# Patient Record
Sex: FEMALE | Race: BLACK OR AFRICAN AMERICAN | NOT HISPANIC OR LATINO | URBAN - METROPOLITAN AREA
[De-identification: names, ages, dates, MRNs, and addresses within clinical notes are randomized per-mention and may not be internally consistent; named-entity substitution may affect disease eponyms.]

---

## 2023-03-31 DIAGNOSIS — I10 BENIGN ESSENTIAL HYPERTENSION: ICD-10-CM

## 2023-04-06 PROBLEM — H10.9 BACTERIAL CONJUNCTIVITIS: Status: ACTIVE | Noted: 2023-04-06

## 2023-04-06 PROBLEM — E55.9 VITAMIN D DEFICIENCY: Status: ACTIVE | Noted: 2023-04-06

## 2023-04-06 PROBLEM — R05.9 COUGH: Status: ACTIVE | Noted: 2023-04-06

## 2023-04-06 PROBLEM — I10 WHITE COAT SYNDROME WITH DIAGNOSIS OF HYPERTENSION: Status: ACTIVE | Noted: 2023-04-06

## 2023-04-06 PROBLEM — N84.1 CERVICAL POLYP: Status: ACTIVE | Noted: 2023-04-06

## 2023-04-06 PROBLEM — I10 BENIGN ESSENTIAL HYPERTENSION: Status: ACTIVE | Noted: 2023-04-06

## 2023-04-06 PROBLEM — R00.0 RACING HEART BEAT: Status: ACTIVE | Noted: 2023-04-06

## 2023-04-06 PROBLEM — N93.9 ABNORMAL UTERINE BLEEDING (AUB): Status: ACTIVE | Noted: 2023-04-06

## 2023-04-06 PROBLEM — E87.6 HYPOKALEMIA: Status: ACTIVE | Noted: 2023-04-06

## 2023-04-06 PROBLEM — E78.2 HYPERLIPEMIA, MIXED: Status: ACTIVE | Noted: 2023-04-06

## 2023-04-06 PROBLEM — D21.9 FIBROIDS: Status: ACTIVE | Noted: 2023-04-06

## 2023-04-06 PROBLEM — J20.9 ACUTE BRONCHITIS: Status: ACTIVE | Noted: 2023-04-06

## 2023-04-06 PROBLEM — N28.9 RENAL INSUFFICIENCY: Status: ACTIVE | Noted: 2023-04-06

## 2023-04-06 PROBLEM — R87.613 HGSIL (HIGH GRADE SQUAMOUS INTRAEPITHELIAL LESION) ON PAP SMEAR OF CERVIX: Status: ACTIVE | Noted: 2023-04-06

## 2023-04-06 PROBLEM — H61.20 CERUMINOSIS: Status: ACTIVE | Noted: 2023-04-06

## 2023-04-06 PROBLEM — J01.90 ACUTE SINUSITIS: Status: ACTIVE | Noted: 2023-04-06

## 2023-04-06 PROBLEM — G47.30 APNEA, SLEEP: Status: ACTIVE | Noted: 2023-04-06

## 2023-04-06 PROBLEM — L91.8 SKIN TAG: Status: ACTIVE | Noted: 2023-04-06

## 2023-04-06 PROBLEM — R00.2 PALPITATIONS: Status: ACTIVE | Noted: 2023-04-06

## 2023-04-06 RX ORDER — TRIAMTERENE AND HYDROCHLOROTHIAZIDE 75; 50 MG/1; MG/1
TABLET ORAL
Qty: 30 TABLET | Refills: 0 | Status: SHIPPED | OUTPATIENT
Start: 2023-04-06 | End: 2023-05-06

## 2023-05-06 DIAGNOSIS — I10 BENIGN ESSENTIAL HYPERTENSION: ICD-10-CM

## 2023-05-06 RX ORDER — TRIAMTERENE AND HYDROCHLOROTHIAZIDE 75; 50 MG/1; MG/1
TABLET ORAL
Qty: 30 TABLET | Refills: 0 | Status: SHIPPED | OUTPATIENT
Start: 2023-05-06 | End: 2023-06-05

## 2023-06-02 ENCOUNTER — APPOINTMENT (OUTPATIENT)
Dept: PRIMARY CARE | Facility: CLINIC | Age: 53
End: 2023-06-02
Payer: COMMERCIAL

## 2023-06-03 DIAGNOSIS — I10 BENIGN ESSENTIAL HYPERTENSION: ICD-10-CM

## 2023-06-05 RX ORDER — TRIAMTERENE AND HYDROCHLOROTHIAZIDE 75; 50 MG/1; MG/1
TABLET ORAL
Qty: 30 TABLET | Refills: 3 | Status: SHIPPED | OUTPATIENT
Start: 2023-06-05 | End: 2023-08-28

## 2023-06-09 DIAGNOSIS — I10 BENIGN ESSENTIAL HYPERTENSION: ICD-10-CM

## 2023-06-09 RX ORDER — AMLODIPINE BESYLATE 10 MG/1
TABLET ORAL
Qty: 90 TABLET | Refills: 2 | Status: SHIPPED | OUTPATIENT
Start: 2023-06-09 | End: 2023-12-15

## 2023-06-30 ENCOUNTER — OFFICE VISIT (OUTPATIENT)
Dept: PRIMARY CARE | Facility: CLINIC | Age: 53
End: 2023-06-30
Payer: COMMERCIAL

## 2023-06-30 ENCOUNTER — LAB (OUTPATIENT)
Dept: LAB | Facility: LAB | Age: 53
End: 2023-06-30
Payer: COMMERCIAL

## 2023-06-30 VITALS
HEIGHT: 66 IN | SYSTOLIC BLOOD PRESSURE: 124 MMHG | HEART RATE: 84 BPM | WEIGHT: 188.6 LBS | BODY MASS INDEX: 30.31 KG/M2 | DIASTOLIC BLOOD PRESSURE: 84 MMHG

## 2023-06-30 DIAGNOSIS — Z12.31 SCREENING MAMMOGRAM, ENCOUNTER FOR: ICD-10-CM

## 2023-06-30 DIAGNOSIS — I10 BENIGN ESSENTIAL HYPERTENSION: ICD-10-CM

## 2023-06-30 DIAGNOSIS — Z00.00 HEALTH CARE MAINTENANCE: Primary | ICD-10-CM

## 2023-06-30 DIAGNOSIS — N28.9 RENAL INSUFFICIENCY: ICD-10-CM

## 2023-06-30 DIAGNOSIS — Z00.00 HEALTH CARE MAINTENANCE: ICD-10-CM

## 2023-06-30 DIAGNOSIS — E78.2 HYPERLIPEMIA, MIXED: ICD-10-CM

## 2023-06-30 DIAGNOSIS — G47.33 OBSTRUCTIVE SLEEP APNEA SYNDROME: ICD-10-CM

## 2023-06-30 PROBLEM — J01.90 ACUTE SINUSITIS: Status: RESOLVED | Noted: 2023-04-06 | Resolved: 2023-06-30

## 2023-06-30 PROBLEM — J20.9 ACUTE BRONCHITIS: Status: RESOLVED | Noted: 2023-04-06 | Resolved: 2023-06-30

## 2023-06-30 PROBLEM — N84.1 CERVICAL POLYP: Status: RESOLVED | Noted: 2023-04-06 | Resolved: 2023-06-30

## 2023-06-30 PROBLEM — H61.20 CERUMINOSIS: Status: RESOLVED | Noted: 2023-04-06 | Resolved: 2023-06-30

## 2023-06-30 PROBLEM — R05.9 COUGH: Status: RESOLVED | Noted: 2023-04-06 | Resolved: 2023-06-30

## 2023-06-30 PROBLEM — N93.9 ABNORMAL UTERINE BLEEDING (AUB): Status: RESOLVED | Noted: 2023-04-06 | Resolved: 2023-06-30

## 2023-06-30 PROBLEM — H10.9 BACTERIAL CONJUNCTIVITIS: Status: RESOLVED | Noted: 2023-04-06 | Resolved: 2023-06-30

## 2023-06-30 LAB
ALANINE AMINOTRANSFERASE (SGPT) (U/L) IN SER/PLAS: 39 U/L (ref 7–45)
ALBUMIN (G/DL) IN SER/PLAS: 4.3 G/DL (ref 3.4–5)
ALKALINE PHOSPHATASE (U/L) IN SER/PLAS: 67 U/L (ref 33–110)
ANION GAP IN SER/PLAS: 14 MMOL/L (ref 10–20)
ASPARTATE AMINOTRANSFERASE (SGOT) (U/L) IN SER/PLAS: 35 U/L (ref 9–39)
BASOPHILS (10*3/UL) IN BLOOD BY AUTOMATED COUNT: 0.04 X10E9/L (ref 0–0.1)
BASOPHILS/100 LEUKOCYTES IN BLOOD BY AUTOMATED COUNT: 0.7 % (ref 0–2)
BILIRUBIN TOTAL (MG/DL) IN SER/PLAS: 0.5 MG/DL (ref 0–1.2)
CALCIDIOL (25 OH VITAMIN D3) (NG/ML) IN SER/PLAS: 34 NG/ML
CALCIUM (MG/DL) IN SER/PLAS: 9.8 MG/DL (ref 8.6–10.6)
CARBON DIOXIDE, TOTAL (MMOL/L) IN SER/PLAS: 29 MMOL/L (ref 21–32)
CHLORIDE (MMOL/L) IN SER/PLAS: 102 MMOL/L (ref 98–107)
CHOLESTEROL (MG/DL) IN SER/PLAS: 143 MG/DL (ref 0–199)
CHOLESTEROL IN HDL (MG/DL) IN SER/PLAS: 49.1 MG/DL
CHOLESTEROL/HDL RATIO: 2.9
CREATININE (MG/DL) IN SER/PLAS: 1.07 MG/DL (ref 0.5–1.05)
EOSINOPHILS (10*3/UL) IN BLOOD BY AUTOMATED COUNT: 0.04 X10E9/L (ref 0–0.7)
EOSINOPHILS/100 LEUKOCYTES IN BLOOD BY AUTOMATED COUNT: 0.7 % (ref 0–6)
ERYTHROCYTE DISTRIBUTION WIDTH (RATIO) BY AUTOMATED COUNT: 14 % (ref 11.5–14.5)
ERYTHROCYTE MEAN CORPUSCULAR HEMOGLOBIN CONCENTRATION (G/DL) BY AUTOMATED: 33.2 G/DL (ref 32–36)
ERYTHROCYTE MEAN CORPUSCULAR VOLUME (FL) BY AUTOMATED COUNT: 92 FL (ref 80–100)
ERYTHROCYTES (10*6/UL) IN BLOOD BY AUTOMATED COUNT: 4.48 X10E12/L (ref 4–5.2)
GFR FEMALE: 63 ML/MIN/1.73M2
GLUCOSE (MG/DL) IN SER/PLAS: 92 MG/DL (ref 74–99)
HEMATOCRIT (%) IN BLOOD BY AUTOMATED COUNT: 41 % (ref 36–46)
HEMOGLOBIN (G/DL) IN BLOOD: 13.6 G/DL (ref 12–16)
IMMATURE GRANULOCYTES/100 LEUKOCYTES IN BLOOD BY AUTOMATED COUNT: 0.2 % (ref 0–0.9)
LDL: 80 MG/DL (ref 0–99)
LEUKOCYTES (10*3/UL) IN BLOOD BY AUTOMATED COUNT: 5.8 X10E9/L (ref 4.4–11.3)
LYMPHOCYTES (10*3/UL) IN BLOOD BY AUTOMATED COUNT: 2 X10E9/L (ref 1.2–4.8)
LYMPHOCYTES/100 LEUKOCYTES IN BLOOD BY AUTOMATED COUNT: 34.8 % (ref 13–44)
MONOCYTES (10*3/UL) IN BLOOD BY AUTOMATED COUNT: 0.53 X10E9/L (ref 0.1–1)
MONOCYTES/100 LEUKOCYTES IN BLOOD BY AUTOMATED COUNT: 9.2 % (ref 2–10)
NEUTROPHILS (10*3/UL) IN BLOOD BY AUTOMATED COUNT: 3.13 X10E9/L (ref 1.2–7.7)
NEUTROPHILS/100 LEUKOCYTES IN BLOOD BY AUTOMATED COUNT: 54.4 % (ref 40–80)
NRBC (PER 100 WBCS) BY AUTOMATED COUNT: 0 /100 WBC (ref 0–0)
PLATELETS (10*3/UL) IN BLOOD AUTOMATED COUNT: 279 X10E9/L (ref 150–450)
POTASSIUM (MMOL/L) IN SER/PLAS: 3.6 MMOL/L (ref 3.5–5.3)
PROTEIN TOTAL: 7 G/DL (ref 6.4–8.2)
SODIUM (MMOL/L) IN SER/PLAS: 141 MMOL/L (ref 136–145)
THYROTROPIN (MIU/L) IN SER/PLAS BY DETECTION LIMIT <= 0.05 MIU/L: 1.31 MIU/L (ref 0.44–3.98)
TRIGLYCERIDE (MG/DL) IN SER/PLAS: 68 MG/DL (ref 0–149)
URATE (MG/DL) IN SER/PLAS: 7 MG/DL (ref 2.3–6.7)
UREA NITROGEN (MG/DL) IN SER/PLAS: 19 MG/DL (ref 6–23)
VLDL: 14 MG/DL (ref 0–40)

## 2023-06-30 PROCEDURE — 99214 OFFICE O/P EST MOD 30 MIN: CPT | Performed by: INTERNAL MEDICINE

## 2023-06-30 PROCEDURE — 82570 ASSAY OF URINE CREATININE: CPT

## 2023-06-30 PROCEDURE — 90471 IMMUNIZATION ADMIN: CPT | Performed by: INTERNAL MEDICINE

## 2023-06-30 PROCEDURE — 82043 UR ALBUMIN QUANTITATIVE: CPT

## 2023-06-30 PROCEDURE — 80053 COMPREHEN METABOLIC PANEL: CPT

## 2023-06-30 PROCEDURE — 81003 URINALYSIS AUTO W/O SCOPE: CPT

## 2023-06-30 PROCEDURE — 84550 ASSAY OF BLOOD/URIC ACID: CPT

## 2023-06-30 PROCEDURE — 82306 VITAMIN D 25 HYDROXY: CPT

## 2023-06-30 PROCEDURE — 36415 COLL VENOUS BLD VENIPUNCTURE: CPT

## 2023-06-30 PROCEDURE — 3079F DIAST BP 80-89 MM HG: CPT | Performed by: INTERNAL MEDICINE

## 2023-06-30 PROCEDURE — 90750 HZV VACC RECOMBINANT IM: CPT | Performed by: INTERNAL MEDICINE

## 2023-06-30 PROCEDURE — 84443 ASSAY THYROID STIM HORMONE: CPT

## 2023-06-30 PROCEDURE — 85025 COMPLETE CBC W/AUTO DIFF WBC: CPT

## 2023-06-30 PROCEDURE — 3074F SYST BP LT 130 MM HG: CPT | Performed by: INTERNAL MEDICINE

## 2023-06-30 PROCEDURE — 1036F TOBACCO NON-USER: CPT | Performed by: INTERNAL MEDICINE

## 2023-06-30 PROCEDURE — 80061 LIPID PANEL: CPT

## 2023-06-30 RX ORDER — ATORVASTATIN CALCIUM 10 MG/1
10 TABLET, FILM COATED ORAL DAILY
COMMUNITY
End: 2023-08-28

## 2023-06-30 ASSESSMENT — ENCOUNTER SYMPTOMS
VOMITING: 0
HEADACHES: 0
PALPITATIONS: 0
DYSURIA: 0
PHOTOPHOBIA: 0
SPEECH DIFFICULTY: 0
TROUBLE SWALLOWING: 0
BACK PAIN: 0
EYE REDNESS: 0
EYE DISCHARGE: 0
SINUS PAIN: 0
ANAL BLEEDING: 0
COUGH: 0
EYE PAIN: 0
WOUND: 0
BRUISES/BLEEDS EASILY: 0
ADENOPATHY: 0
SLEEP DISTURBANCE: 0
DIFFICULTY URINATING: 0
NAUSEA: 0
HEMATURIA: 0
FREQUENCY: 0
WEAKNESS: 0
ARTHRALGIAS: 0
WHEEZING: 0
TREMORS: 0
POLYDIPSIA: 0
NECK PAIN: 0
FLANK PAIN: 0
POLYPHAGIA: 0
FATIGUE: 1
RECTAL PAIN: 0
JOINT SWELLING: 0
SINUS PRESSURE: 0
NUMBNESS: 0
FACIAL ASYMMETRY: 0
BLOOD IN STOOL: 0
SHORTNESS OF BREATH: 0
NECK STIFFNESS: 0
CHILLS: 0
MYALGIAS: 0
VOICE CHANGE: 0
CONSTIPATION: 0
ACTIVITY CHANGE: 0
FACIAL SWELLING: 0
STRIDOR: 0
RHINORRHEA: 0
DIAPHORESIS: 0
EYE ITCHING: 0
APPETITE CHANGE: 0
DIZZINESS: 0
CHOKING: 0
SORE THROAT: 0
ABDOMINAL DISTENTION: 0
SEIZURES: 0
ABDOMINAL PAIN: 0
COLOR CHANGE: 0
DIARRHEA: 0
CHEST TIGHTNESS: 0
LIGHT-HEADEDNESS: 0

## 2023-06-30 NOTE — PROGRESS NOTES
Subjective   Patient ID: Ruby Escoto is a 52 y.o. female who presents for No chief complaint on file..    Patient presents for follow-up.  She has been compliant with her medications but not diet or exercise.  She has gained weight.  She has recently started back exercising.  She is been complaining of fatigue.  She denies any headaches, no dizziness, no sinus problems, no chest pain or shortness of breath.  She denies abdominal pain no nausea vomiting or diarrhea.  She reports no new musculoskeletal complaints.  She does report nonrestorative sleep and snoring.         Review of Systems   Constitutional:  Positive for fatigue. Negative for activity change, appetite change, chills and diaphoresis.   HENT:  Negative for congestion, dental problem, drooling, ear discharge, ear pain, facial swelling, hearing loss, mouth sores, nosebleeds, postnasal drip, rhinorrhea, sinus pressure, sinus pain, sneezing, sore throat, tinnitus, trouble swallowing and voice change.    Eyes:  Negative for photophobia, pain, discharge, redness, itching and visual disturbance.   Respiratory:  Negative for cough, choking, chest tightness, shortness of breath, wheezing and stridor.    Cardiovascular:  Negative for chest pain, palpitations and leg swelling.   Gastrointestinal:  Negative for abdominal distention, abdominal pain, anal bleeding, blood in stool, constipation, diarrhea, nausea, rectal pain and vomiting.   Endocrine: Negative for cold intolerance, heat intolerance, polydipsia, polyphagia and polyuria.   Genitourinary:  Negative for decreased urine volume, difficulty urinating, dysuria, enuresis, flank pain, frequency, genital sores, hematuria and urgency.   Musculoskeletal:  Negative for arthralgias, back pain, gait problem, joint swelling, myalgias, neck pain and neck stiffness.   Skin:  Negative for color change, pallor, rash and wound.   Neurological:  Negative for dizziness, tremors, seizures, syncope, facial asymmetry, speech  "difficulty, weakness, light-headedness, numbness and headaches.   Hematological:  Negative for adenopathy. Does not bruise/bleed easily.   Psychiatric/Behavioral:  Negative for sleep disturbance.        Objective   /84   Pulse 84   Ht 1.676 m (5' 6\")   Wt 85.5 kg (188 lb 9.6 oz)   BMI 30.44 kg/m²     Physical Exam  Constitutional:       Appearance: Normal appearance.   Cardiovascular:      Rate and Rhythm: Normal rate and regular rhythm.      Heart sounds: No murmur heard.     No gallop.   Pulmonary:      Effort: No respiratory distress.      Breath sounds: No wheezing or rales.   Abdominal:      General: There is no distension.      Palpations: There is no mass.      Tenderness: There is no abdominal tenderness. There is no guarding.   Musculoskeletal:      Right lower leg: No edema.      Left lower leg: No edema.   Neurological:      Mental Status: She is alert.         Assessment/Plan   Diagnoses and all orders for this visit:  Health care maintenance-Cologuard has been done.  We will give the Shingrix vaccine today.  We will schedule mammogram.  Pap with GYN  -     CBC and Auto Differential; Future  -     Vitamin D, Total; Future  -     TSH with reflex to Free T4 if abnormal; Future  -     Uric Acid; Future  -     Urinalysis with Reflex Microscopic; Future  -     Albumin , Urine Random; Future  -     Comprehensive Metabolic Panel; Future  -     Lipid Panel; Future  -     Zoster vaccine, recombinant, adult (SHINGRIX)  Screening mammogram, encounter for  -     BI mammo bilateral screening tomosynthesis; Future  Obstructive sleep apnea syndrome-we will arrange a sleep study  -     Home sleep apnea test (HSAT); Future  Hyperlipemia, mixed-check a fast lipid profile  Benign essential hypertension-low-salt diet and exercise  Renal insufficiency-we will recheck creatinine.       "

## 2023-06-30 NOTE — PATIENT INSTRUCTIONS
Please schedule your mammogram.  Obtain fasting blood work and urine.  You will be notified about a sleep study.  Follow-up in 3 months.  Diet and exercise.

## 2023-07-01 LAB
ALBUMIN (MG/L) IN URINE: 13 MG/L
ALBUMIN/CREATININE (UG/MG) IN URINE: 5.1 UG/MG CRT (ref 0–30)
APPEARANCE, URINE: CLEAR
BILIRUBIN, URINE: NEGATIVE
BLOOD, URINE: NEGATIVE
COLOR, URINE: YELLOW
CREATININE (MG/DL) IN URINE: 256 MG/DL (ref 20–320)
GLUCOSE, URINE: NEGATIVE MG/DL
KETONES, URINE: NEGATIVE MG/DL
LEUKOCYTE ESTERASE, URINE: NEGATIVE
NITRITE, URINE: NEGATIVE
PH, URINE: 6 (ref 5–8)
PROTEIN, URINE: NEGATIVE MG/DL
SPECIFIC GRAVITY, URINE: 1.03 (ref 1–1.03)
UROBILINOGEN, URINE: <2 MG/DL (ref 0–1.9)

## 2023-08-26 DIAGNOSIS — E78.2 MIXED HYPERLIPIDEMIA: ICD-10-CM

## 2023-08-28 DIAGNOSIS — I10 BENIGN ESSENTIAL HYPERTENSION: ICD-10-CM

## 2023-08-28 RX ORDER — ATORVASTATIN CALCIUM 10 MG/1
10 TABLET, FILM COATED ORAL DAILY
Qty: 90 TABLET | Refills: 1 | Status: SHIPPED | OUTPATIENT
Start: 2023-08-28 | End: 2023-12-15

## 2023-08-28 RX ORDER — TRIAMTERENE AND HYDROCHLOROTHIAZIDE 75; 50 MG/1; MG/1
TABLET ORAL
Qty: 90 TABLET | Refills: 1 | Status: SHIPPED | OUTPATIENT
Start: 2023-08-28 | End: 2024-02-23

## 2023-09-14 DIAGNOSIS — R92.8 ABNORMAL MAMMOGRAM: Primary | ICD-10-CM

## 2023-10-02 ENCOUNTER — APPOINTMENT (OUTPATIENT)
Dept: PRIMARY CARE | Facility: CLINIC | Age: 53
End: 2023-10-02
Payer: COMMERCIAL

## 2023-10-02 ASSESSMENT — ENCOUNTER SYMPTOMS
HYPERTENSION: 1
PND: 0
BLURRED VISION: 0
HYPERTENSION: 1
BLURRED VISION: 0
PALPITATIONS: 0
HEADACHES: 0
SWEATS: 0
NECK PAIN: 0
PND: 0
SWEATS: 0
SHORTNESS OF BREATH: 0
NECK PAIN: 0
ORTHOPNEA: 0
ORTHOPNEA: 0
HEADACHES: 0
PALPITATIONS: 0
SHORTNESS OF BREATH: 0

## 2023-10-20 ENCOUNTER — APPOINTMENT (OUTPATIENT)
Dept: PRIMARY CARE | Facility: CLINIC | Age: 53
End: 2023-10-20
Payer: COMMERCIAL

## 2023-10-25 ENCOUNTER — APPOINTMENT (OUTPATIENT)
Dept: OBSTETRICS AND GYNECOLOGY | Facility: HOSPITAL | Age: 53
End: 2023-10-25
Payer: COMMERCIAL

## 2023-12-02 PROCEDURE — 95806 SLEEP STUDY UNATT&RESP EFFT: CPT | Performed by: INTERNAL MEDICINE

## 2023-12-04 ENCOUNTER — CLINICAL SUPPORT (OUTPATIENT)
Dept: SLEEP MEDICINE | Facility: HOSPITAL | Age: 53
End: 2023-12-04
Payer: COMMERCIAL

## 2023-12-04 DIAGNOSIS — G47.33 OBSTRUCTIVE SLEEP APNEA SYNDROME: ICD-10-CM

## 2023-12-04 PROCEDURE — 95806 SLEEP STUDY UNATT&RESP EFFT: CPT | Performed by: INTERNAL MEDICINE

## 2023-12-06 ENCOUNTER — OFFICE VISIT (OUTPATIENT)
Dept: OBSTETRICS AND GYNECOLOGY | Facility: CLINIC | Age: 53
End: 2023-12-06
Payer: COMMERCIAL

## 2023-12-06 VITALS
HEIGHT: 66 IN | DIASTOLIC BLOOD PRESSURE: 84 MMHG | WEIGHT: 192.6 LBS | SYSTOLIC BLOOD PRESSURE: 120 MMHG | BODY MASS INDEX: 30.95 KG/M2

## 2023-12-06 DIAGNOSIS — R32 URINARY INCONTINENCE, UNSPECIFIED TYPE: ICD-10-CM

## 2023-12-06 DIAGNOSIS — N93.9 ABNORMAL UTERINE BLEEDING (AUB): ICD-10-CM

## 2023-12-06 DIAGNOSIS — Z01.419 ENCOUNTER FOR ANNUAL ROUTINE GYNECOLOGICAL EXAMINATION: Primary | ICD-10-CM

## 2023-12-06 PROCEDURE — 1036F TOBACCO NON-USER: CPT | Performed by: OBSTETRICS & GYNECOLOGY

## 2023-12-06 PROCEDURE — 87624 HPV HI-RISK TYP POOLED RSLT: CPT

## 2023-12-06 PROCEDURE — 88175 CYTOPATH C/V AUTO FLUID REDO: CPT

## 2023-12-06 PROCEDURE — 88141 CYTOPATH C/V INTERPRET: CPT | Performed by: PATHOLOGY

## 2023-12-06 PROCEDURE — 99396 PREV VISIT EST AGE 40-64: CPT | Performed by: OBSTETRICS & GYNECOLOGY

## 2023-12-06 PROCEDURE — 3079F DIAST BP 80-89 MM HG: CPT | Performed by: OBSTETRICS & GYNECOLOGY

## 2023-12-06 PROCEDURE — 3074F SYST BP LT 130 MM HG: CPT | Performed by: OBSTETRICS & GYNECOLOGY

## 2023-12-06 RX ORDER — DROSPIRENONE 4 MG/1
4 TABLET, FILM COATED ORAL DAILY
COMMUNITY
End: 2023-12-15

## 2023-12-06 ASSESSMENT — PAIN SCALES - GENERAL: PAINLEVEL: 0-NO PAIN

## 2023-12-06 NOTE — PROGRESS NOTES
53-year-old -0-1-2 -American woman presents today for annual GYN exam.  She has had a recent history of abnormal Pap smears including a LEEP in 2018 for HERRERA-2 with LEEP pathology revealing HERRERA-1.    Patient reports 5 to 6 months change in her cycle without any significant menopause symptoms.  She is also recently bothered by urinary incontinence when she is exercising or when she coughs.    Subjective   Patient ID: Ruby Escoto is a 53 y.o. female who presents for Annual Exam.    Objective   Physical Exam  Exam conducted with a chaperone present.   Constitutional:       Appearance: Normal appearance.   HENT:      Head: Normocephalic.      Nose: Nose normal.      Mouth/Throat:      Mouth: Mucous membranes are moist.   Eyes:      Extraocular Movements: Extraocular movements intact.      Pupils: Pupils are equal, round, and reactive to light.   Cardiovascular:      Rate and Rhythm: Normal rate and regular rhythm.   Pulmonary:      Effort: Pulmonary effort is normal.      Breath sounds: Normal breath sounds.   Chest:   Breasts:     Right: Normal. No mass or tenderness.      Left: Normal. No mass or skin change.   Abdominal:      Palpations: Abdomen is soft.   Genitourinary:     General: Normal vulva.      Labia:         Right: No tenderness or lesion.         Left: No tenderness or lesion.       Vagina: Normal.      Cervix: Normal.      Uterus: Normal.       Adnexa: Right adnexa normal and left adnexa normal.        Right: No mass, tenderness or fullness.          Left: No mass, tenderness or fullness.     Musculoskeletal:         General: Normal range of motion.      Cervical back: Normal range of motion and neck supple.   Lymphadenopathy:      Upper Body:      Right upper body: No axillary adenopathy.      Left upper body: No axillary adenopathy.   Skin:     General: Skin is dry.   Neurological:      General: No focal deficit present.      Mental Status: She is alert and oriented to person, place, and  time.      Cranial Nerves: No cranial nerve deficit.   Psychiatric:         Mood and Affect: Mood normal.         Behavior: Behavior normal.         Thought Content: Thought content normal.         Judgment: Judgment normal.       A/P: APE     -  Pap sent     -  Mammogram    -  PCP F/U     -  Urgyn F/U for VIRGILIO sx     AUB/perimenopausal     -  Labs     -  US

## 2023-12-06 NOTE — PATIENT INSTRUCTIONS
Thanks for coming in today for your annual GYN exam.    A Pap smear was sent.  Results should be available in the next few weeks.  However, if you have been unable to review the results by the end of the month please give the office a call.    Arrange to have a mammogram performed once a year.    Follow-up with urogynecology about your bladder issues.    Arrange to have an ultrasound performed to help evaluate your irregular cycles.    Labs are sent today to help evaluate your thyroid and menopause hormone level because of your abnormal bleeding.  Results should be available in the next 24 to 48 hours.  You may call the office and select option #2 to speak with the nurse to obtain the results.    Follow-up with your PCP and other healthcare specialist as needed.    Feel free to call the office with any problems, questions or concerns prior to your next scheduled visit.

## 2023-12-07 ENCOUNTER — OFFICE VISIT (OUTPATIENT)
Dept: PRIMARY CARE | Facility: CLINIC | Age: 53
End: 2023-12-07
Payer: COMMERCIAL

## 2023-12-07 VITALS
HEART RATE: 72 BPM | BODY MASS INDEX: 30.54 KG/M2 | WEIGHT: 189.2 LBS | SYSTOLIC BLOOD PRESSURE: 120 MMHG | DIASTOLIC BLOOD PRESSURE: 84 MMHG

## 2023-12-07 DIAGNOSIS — E78.2 HYPERLIPEMIA, MIXED: ICD-10-CM

## 2023-12-07 DIAGNOSIS — Z23 NEED FOR SHINGLES VACCINE: ICD-10-CM

## 2023-12-07 DIAGNOSIS — N18.1 CHRONIC RENAL IMPAIRMENT, STAGE 1: ICD-10-CM

## 2023-12-07 DIAGNOSIS — I10 BENIGN ESSENTIAL HYPERTENSION: Primary | ICD-10-CM

## 2023-12-07 PROCEDURE — 3074F SYST BP LT 130 MM HG: CPT | Performed by: INTERNAL MEDICINE

## 2023-12-07 PROCEDURE — 90750 HZV VACC RECOMBINANT IM: CPT | Performed by: INTERNAL MEDICINE

## 2023-12-07 PROCEDURE — 99214 OFFICE O/P EST MOD 30 MIN: CPT | Performed by: INTERNAL MEDICINE

## 2023-12-07 PROCEDURE — 3079F DIAST BP 80-89 MM HG: CPT | Performed by: INTERNAL MEDICINE

## 2023-12-07 PROCEDURE — 1036F TOBACCO NON-USER: CPT | Performed by: INTERNAL MEDICINE

## 2023-12-07 PROCEDURE — 90471 IMMUNIZATION ADMIN: CPT | Performed by: INTERNAL MEDICINE

## 2023-12-07 SDOH — HEALTH STABILITY: PHYSICAL HEALTH: ON AVERAGE, HOW MANY MINUTES DO YOU ENGAGE IN EXERCISE AT THIS LEVEL?: 30 MIN

## 2023-12-07 SDOH — HEALTH STABILITY: PHYSICAL HEALTH: ON AVERAGE, HOW MANY DAYS PER WEEK DO YOU ENGAGE IN MODERATE TO STRENUOUS EXERCISE (LIKE A BRISK WALK)?: 3 DAYS

## 2023-12-07 ASSESSMENT — ENCOUNTER SYMPTOMS
ADENOPATHY: 0
HEADACHES: 0
TROUBLE SWALLOWING: 0
SPEECH DIFFICULTY: 0
BLOOD IN STOOL: 0
COUGH: 0
MYALGIAS: 0
JOINT SWELLING: 0
RHINORRHEA: 0
DIZZINESS: 0
SLEEP DISTURBANCE: 0
BLURRED VISION: 0
NECK PAIN: 0
VOMITING: 0
ARTHRALGIAS: 0
POLYPHAGIA: 0
ACTIVITY CHANGE: 0
SORE THROAT: 0
COLOR CHANGE: 0
HYPERTENSION: 1
FREQUENCY: 0
EYE REDNESS: 0
POLYDIPSIA: 0
PALPITATIONS: 0
SHORTNESS OF BREATH: 0
SEIZURES: 0
WHEEZING: 0
ORTHOPNEA: 0
SINUS PAIN: 0
FACIAL SWELLING: 0
FACIAL ASYMMETRY: 0
STRIDOR: 0
PND: 0
ABDOMINAL PAIN: 0
WEAKNESS: 0
LIGHT-HEADEDNESS: 0
FLANK PAIN: 0
DIFFICULTY URINATING: 0
BACK PAIN: 0
SWEATS: 0
RECTAL PAIN: 0
DYSURIA: 0
TREMORS: 0
NECK STIFFNESS: 0
HEMATURIA: 0
BRUISES/BLEEDS EASILY: 0
CHOKING: 0
ABDOMINAL DISTENTION: 0
NUMBNESS: 0
CHILLS: 0
EYE PAIN: 0
APPETITE CHANGE: 0
WOUND: 0
EYE DISCHARGE: 0
SINUS PRESSURE: 0
DIARRHEA: 0
VOICE CHANGE: 0
CONSTIPATION: 0
PHOTOPHOBIA: 0
DIAPHORESIS: 0
NAUSEA: 0
EYE ITCHING: 0
CHEST TIGHTNESS: 0
ANAL BLEEDING: 0

## 2023-12-07 ASSESSMENT — LIFESTYLE VARIABLES
HOW OFTEN DO YOU HAVE SIX OR MORE DRINKS ON ONE OCCASION: NEVER
SKIP TO QUESTIONS 9-10: 1
HOW MANY STANDARD DRINKS CONTAINING ALCOHOL DO YOU HAVE ON A TYPICAL DAY: 1 OR 2
HOW OFTEN DO YOU HAVE A DRINK CONTAINING ALCOHOL: 2-4 TIMES A MONTH
AUDIT-C TOTAL SCORE: 2

## 2023-12-07 ASSESSMENT — PATIENT HEALTH QUESTIONNAIRE - PHQ9
1. LITTLE INTEREST OR PLEASURE IN DOING THINGS: NOT AT ALL
2. FEELING DOWN, DEPRESSED OR HOPELESS: NOT AT ALL
SUM OF ALL RESPONSES TO PHQ9 QUESTIONS 1 & 2: 0

## 2023-12-07 ASSESSMENT — PAIN SCALES - GENERAL: PAINLEVEL: 0-NO PAIN

## 2023-12-07 NOTE — PROGRESS NOTES
Answers submitted by the patient for this visit:  High Blood Pressure Questionnaire (Submitted on 12/7/2023)  Chief Complaint: Hypertension  Chronicity: recurrent  Onset: more than 1 year ago  Progression since onset: gradually improving  Condition status: controlled  anxiety: No  blurred vision: No  chest pain: No  headaches: No  malaise/fatigue: Yes  neck pain: No  orthopnea: No  palpitations: No  peripheral edema: No  PND: No  shortness of breath: No  sweats: No  Agents associated with hypertension: oral contraceptives  CAD risks: family history, obesity, stress    Conflicting answers have been found for some questions. Please document the patient's answers manually. yes  Subjective   Patient ID: Ruby Escoto is a 53 y.o. female who presents for Follow-up (3 month ).    Patient presents for follow-up.  She has been compliant with her medications, diet and exercise.  She reports that her blood pressure has been normal at home.  She is interested in decreasing the dose of triamterene.  She denies any headaches, no dizziness, no sinus problems, no chest pain or shortness of breath.  She denies abdominal pain no nausea vomiting or diarrhea.  She reports no new musculoskeletal complaints.    Hypertension  This is a recurrent problem. The current episode started more than 1 year ago. The problem has been gradually improving since onset. The problem is controlled. Associated symptoms include malaise/fatigue. Pertinent negatives include no anxiety, blurred vision, chest pain, headaches, neck pain, orthopnea, palpitations, peripheral edema, PND, shortness of breath or sweats. Agents associated with hypertension include oral contraceptives. Risk factors for coronary artery disease include family history, obesity and stress.        Review of Systems   Constitutional:  Positive for fatigue and malaise/fatigue. Negative for activity change, appetite change, chills and diaphoresis.   HENT:  Negative for congestion, dental  problem, drooling, ear discharge, ear pain, facial swelling, hearing loss, mouth sores, nosebleeds, postnasal drip, rhinorrhea, sinus pressure, sinus pain, sneezing, sore throat, tinnitus, trouble swallowing and voice change.    Eyes:  Negative for blurred vision, photophobia, pain, discharge, redness, itching and visual disturbance.   Respiratory:  Negative for cough, choking, chest tightness, shortness of breath, wheezing and stridor.    Cardiovascular:  Negative for chest pain, palpitations, orthopnea, leg swelling and PND.   Gastrointestinal:  Negative for abdominal distention, abdominal pain, anal bleeding, blood in stool, constipation, diarrhea, nausea, rectal pain and vomiting.   Endocrine: Negative for cold intolerance, heat intolerance, polydipsia, polyphagia and polyuria.   Genitourinary:  Negative for decreased urine volume, difficulty urinating, dysuria, enuresis, flank pain, frequency, genital sores, hematuria and urgency.   Musculoskeletal:  Negative for arthralgias, back pain, gait problem, joint swelling, myalgias, neck pain and neck stiffness.   Skin:  Negative for color change, pallor, rash and wound.   Neurological:  Negative for dizziness, tremors, seizures, syncope, facial asymmetry, speech difficulty, weakness, light-headedness, numbness and headaches.   Hematological:  Negative for adenopathy. Does not bruise/bleed easily.   Psychiatric/Behavioral:  Negative for sleep disturbance.        Objective   /84   Pulse 72   Wt 85.8 kg (189 lb 3.2 oz)   LMP 11/17/2023   BMI 30.54 kg/m²     Physical Exam  Constitutional:       Appearance: Normal appearance.   Cardiovascular:      Rate and Rhythm: Normal rate and regular rhythm.      Heart sounds: No murmur heard.     No gallop.   Pulmonary:      Effort: No respiratory distress.      Breath sounds: No wheezing or rales.   Abdominal:      General: There is no distension.      Palpations: There is no mass.      Tenderness: There is no abdominal  tenderness. There is no guarding.   Musculoskeletal:      Right lower leg: No edema.      Left lower leg: No edema.   Neurological:      Mental Status: She is alert.         Assessment/Plan   Diagnoses and all orders for this visit:  Benign essential hypertension-she will we will decrease her triamterene to 37.5/25 mg.  She will follow a low-salt diet and exercise  -     Basic Metabolic Panel; Future  Hyperlipemia, mixed-she will watch her diet and exercise  Chronic renal impairment, stage 1-recheck creatinine.  Health maintenance-we will get the second sugars vaccine today.  Cologuard has been done.  Mammogram is up-to-date.  Pap with GYN.

## 2023-12-08 ASSESSMENT — ENCOUNTER SYMPTOMS: FATIGUE: 1

## 2023-12-14 DIAGNOSIS — N93.9 ABNORMAL UTERINE BLEEDING (AUB): Primary | ICD-10-CM

## 2023-12-14 DIAGNOSIS — I10 BENIGN ESSENTIAL HYPERTENSION: ICD-10-CM

## 2023-12-14 DIAGNOSIS — E78.2 MIXED HYPERLIPIDEMIA: ICD-10-CM

## 2023-12-15 RX ORDER — AMLODIPINE BESYLATE 10 MG/1
TABLET ORAL
Qty: 90 TABLET | Refills: 2 | Status: SHIPPED | OUTPATIENT
Start: 2023-12-15

## 2023-12-15 RX ORDER — DROSPIRENONE 4 MG/1
1 TABLET, FILM COATED ORAL DAILY
Qty: 84 TABLET | Refills: 1 | Status: SHIPPED | OUTPATIENT
Start: 2023-12-15

## 2023-12-15 RX ORDER — ATORVASTATIN CALCIUM 10 MG/1
10 TABLET, FILM COATED ORAL DAILY
Qty: 90 TABLET | Refills: 1 | Status: SHIPPED | OUTPATIENT
Start: 2023-12-15

## 2023-12-21 ENCOUNTER — ANCILLARY PROCEDURE (OUTPATIENT)
Dept: RADIOLOGY | Facility: CLINIC | Age: 53
End: 2023-12-21
Payer: COMMERCIAL

## 2023-12-21 DIAGNOSIS — N93.9 ABNORMAL UTERINE BLEEDING (AUB): ICD-10-CM

## 2023-12-21 PROCEDURE — 76856 US EXAM PELVIC COMPLETE: CPT

## 2023-12-21 PROCEDURE — 76857 US EXAM PELVIC LIMITED: CPT | Performed by: RADIOLOGY

## 2024-01-02 ENCOUNTER — TELEPHONE (OUTPATIENT)
Dept: OBSTETRICS AND GYNECOLOGY | Facility: CLINIC | Age: 54
End: 2024-01-02
Payer: COMMERCIAL

## 2024-01-02 NOTE — TELEPHONE ENCOUNTER
Pt returned call:  Pt verified by name and   Pt is aware of Dr. Guardado's message:  Left  message for pt to return call:  Sayda Guardado MD  [facility] [address]  The patient noted a change in her cycle at the time of her APE. The US shows a normal stripe, but multiple fibroids. The Pap result is still pending. The patient has not had her FSH and TSH drawn yet. RTC for follow up/to review labs and US, EMBx may be needed.  Pt given number to call to schedule appt.  Pt has no questions or concerns at this time.

## 2024-01-12 ENCOUNTER — TELEPHONE (OUTPATIENT)
Dept: OBSTETRICS AND GYNECOLOGY | Facility: CLINIC | Age: 54
End: 2024-01-12
Payer: COMMERCIAL

## 2024-01-12 NOTE — TELEPHONE ENCOUNTER
Pt verified by name and .  Pt is aware of Dr. Guardado's message:  Sayda Guardado MD  [facility] [address] Clinical Support Staff  Needs colpo.  Nurse scheduled pt for  at 2:30 pm.  Pt has no questions or concerns at this time.

## 2024-01-13 ENCOUNTER — LAB (OUTPATIENT)
Dept: LAB | Facility: LAB | Age: 54
End: 2024-01-13
Payer: COMMERCIAL

## 2024-01-13 DIAGNOSIS — N93.9 ABNORMAL UTERINE BLEEDING (AUB): ICD-10-CM

## 2024-01-13 DIAGNOSIS — I10 BENIGN ESSENTIAL HYPERTENSION: ICD-10-CM

## 2024-01-13 LAB
ANION GAP SERPL CALC-SCNC: 12 MMOL/L (ref 10–20)
BUN SERPL-MCNC: 12 MG/DL (ref 6–23)
CALCIUM SERPL-MCNC: 9.5 MG/DL (ref 8.6–10.6)
CHLORIDE SERPL-SCNC: 102 MMOL/L (ref 98–107)
CO2 SERPL-SCNC: 29 MMOL/L (ref 21–32)
CREAT SERPL-MCNC: 1.06 MG/DL (ref 0.5–1.05)
EGFRCR SERPLBLD CKD-EPI 2021: 63 ML/MIN/1.73M*2
FSH SERPL-ACNC: 11.9 IU/L
GLUCOSE SERPL-MCNC: 85 MG/DL (ref 74–99)
POTASSIUM SERPL-SCNC: 3.5 MMOL/L (ref 3.5–5.3)
SODIUM SERPL-SCNC: 139 MMOL/L (ref 136–145)
TSH SERPL-ACNC: 1.32 MIU/L (ref 0.44–3.98)

## 2024-01-13 PROCEDURE — 83001 ASSAY OF GONADOTROPIN (FSH): CPT

## 2024-01-13 PROCEDURE — 80048 BASIC METABOLIC PNL TOTAL CA: CPT

## 2024-01-13 PROCEDURE — 84443 ASSAY THYROID STIM HORMONE: CPT

## 2024-01-13 PROCEDURE — 36415 COLL VENOUS BLD VENIPUNCTURE: CPT

## 2024-01-14 DIAGNOSIS — G47.33 OBSTRUCTIVE SLEEP APNEA SYNDROME: Primary | ICD-10-CM

## 2024-02-23 DIAGNOSIS — I10 BENIGN ESSENTIAL HYPERTENSION: ICD-10-CM

## 2024-02-23 RX ORDER — TRIAMTERENE AND HYDROCHLOROTHIAZIDE 75; 50 MG/1; MG/1
TABLET ORAL
Qty: 90 TABLET | Refills: 1 | Status: SHIPPED | OUTPATIENT
Start: 2024-02-23

## 2024-02-29 ENCOUNTER — APPOINTMENT (OUTPATIENT)
Dept: OBSTETRICS AND GYNECOLOGY | Facility: CLINIC | Age: 54
End: 2024-02-29
Payer: COMMERCIAL

## 2024-03-07 ENCOUNTER — APPOINTMENT (OUTPATIENT)
Dept: PRIMARY CARE | Facility: CLINIC | Age: 54
End: 2024-03-07
Payer: COMMERCIAL

## 2024-03-13 ENCOUNTER — PROCEDURE VISIT (OUTPATIENT)
Dept: OBSTETRICS AND GYNECOLOGY | Facility: CLINIC | Age: 54
End: 2024-03-13
Payer: COMMERCIAL

## 2024-03-13 VITALS
DIASTOLIC BLOOD PRESSURE: 80 MMHG | WEIGHT: 191 LBS | SYSTOLIC BLOOD PRESSURE: 118 MMHG | BODY MASS INDEX: 30.7 KG/M2 | HEIGHT: 66 IN

## 2024-03-13 DIAGNOSIS — N87.0 DYSPLASIA OF CERVIX, LOW GRADE (CIN 1): ICD-10-CM

## 2024-03-13 DIAGNOSIS — R87.611 ATYPICAL SQUAMOUS CELLS CANNOT EXCLUDE HIGH GRADE SQUAMOUS INTRAEPITHELIAL LESION ON CYTOLOGIC SMEAR OF CERVIX (ASC-H): Primary | ICD-10-CM

## 2024-03-13 LAB — PREGNANCY TEST URINE, POC: NEGATIVE

## 2024-03-13 PROCEDURE — 88305 TISSUE EXAM BY PATHOLOGIST: CPT | Performed by: PATHOLOGY

## 2024-03-13 PROCEDURE — 81025 URINE PREGNANCY TEST: CPT | Performed by: OBSTETRICS & GYNECOLOGY

## 2024-03-13 PROCEDURE — 57454 BX/CURETT OF CERVIX W/SCOPE: CPT | Performed by: OBSTETRICS & GYNECOLOGY

## 2024-03-13 NOTE — PROGRESS NOTES
53-year-old -0-1-2 -American woman presents today for colposcopy.  She has a history of a LEEP in 2018 for HERRERA 2, however pathology showed HERRERA-1.    Her 2023 Pap showed ASC-H with negative HPV testing.     ..Colposcopy    Date/Time: 3/13/2024 9:48 AM    Performed by: Sayda Guardado MD  Authorized by: Sayda Guardado MD    Consent:     Patient questions answered: yes      Risks and benefits of the procedure and its alternatives discussed: yes      Procedural risks discussed:  Bleeding, infection, repeat procedure and possible continued pain    Consent obtained:  Verbal and written    Consent given by:  Patient  Universal Protocol:     A time out verifies correct patient, procedure, equipment, support staff, and site/side marked as required: yes      Patient states understanding of procedure being performed: yes      Relevant documents present and verified: yes      Test results available and properly labeled: yes      Required blood products, implants, devices, and special equipment available: yes    Indication:     Other indication(s): clinical abnormality    Pre-procedure:     Prep solution(s): acetic acid    Procedure:     Colposcopy with: cervical biopsy and endocervical curettage      Colposcopy details:  ?post LEEP change vs 6 o'clock polyp    Cervix visibility: fully visualized      SCJ visibility: fully visualized      Lesion visualized: fully visualized    Post-procedure:     Patient tolerance of procedure:  Patient tolerated the procedure well with no immediate complications    Estimated blood loss (mL):  8    Instructions and paperwork completed: yes      Educational handouts given: yes         A/P: ASC-H Pap     -  Colpo     -  Bx/polypectomy and ECC     -  Post procedure instructions     -  Call for results

## 2024-03-13 NOTE — LETTER
May 9, 2024     Patient: Ruby Escoto   YOB: 1970   Date of Visit: 3/13/2024       To Whom It May Concern:    Ruby Escoto was seen in my clinic on 3/13/2024 at 9:00 am. Please excuse Ruby for her absence from work on this day to make the appointment, for a procedure.    If you have any questions or concerns, please don't hesitate to call.         Sincerely,         Sayda Guardado MD        CC: No Recipients

## 2024-03-13 NOTE — PATIENT INSTRUCTIONS
Thanks for coming in today for colposcopy.      Biopsies were sent.  Results should be available in 2 weeks.  Please call the office and select option #2 to speak with nurse to obtain the results.      Review the after procedure instruction sheet.      Refrain from placing anything inside vagina-no sex, tampons or douching for the next 5 to 7 days allow the biopsy site to heal.      Feel free to call the office with any problems, questions or concerns prior to your next scheduled visit.

## 2024-03-19 LAB
LABORATORY COMMENT REPORT: NORMAL
PATH REPORT.FINAL DX SPEC: NORMAL
PATH REPORT.GROSS SPEC: NORMAL
PATH REPORT.RELEVANT HX SPEC: NORMAL
PATH REPORT.TOTAL CANCER: NORMAL

## 2024-03-24 ASSESSMENT — ENCOUNTER SYMPTOMS
NECK PAIN: 0
PALPITATIONS: 0
BLURRED VISION: 0
HYPERTENSION: 1
PND: 0
SHORTNESS OF BREATH: 0
SWEATS: 0
ORTHOPNEA: 0
HEADACHES: 0

## 2024-03-26 ENCOUNTER — LAB (OUTPATIENT)
Dept: LAB | Facility: LAB | Age: 54
End: 2024-03-26
Payer: COMMERCIAL

## 2024-03-26 ENCOUNTER — OFFICE VISIT (OUTPATIENT)
Dept: PRIMARY CARE | Facility: CLINIC | Age: 54
End: 2024-03-26
Payer: COMMERCIAL

## 2024-03-26 VITALS
HEART RATE: 68 BPM | BODY MASS INDEX: 30.67 KG/M2 | DIASTOLIC BLOOD PRESSURE: 82 MMHG | WEIGHT: 190 LBS | SYSTOLIC BLOOD PRESSURE: 120 MMHG

## 2024-03-26 DIAGNOSIS — N28.9 RENAL INSUFFICIENCY: ICD-10-CM

## 2024-03-26 DIAGNOSIS — I10 BENIGN ESSENTIAL HYPERTENSION: ICD-10-CM

## 2024-03-26 DIAGNOSIS — I10 BENIGN ESSENTIAL HYPERTENSION: Primary | ICD-10-CM

## 2024-03-26 DIAGNOSIS — E78.2 HYPERLIPEMIA, MIXED: ICD-10-CM

## 2024-03-26 LAB
ANION GAP SERPL CALC-SCNC: 14 MMOL/L (ref 10–20)
BUN SERPL-MCNC: 13 MG/DL (ref 6–23)
CALCIUM SERPL-MCNC: 10.2 MG/DL (ref 8.6–10.6)
CHLORIDE SERPL-SCNC: 100 MMOL/L (ref 98–107)
CO2 SERPL-SCNC: 30 MMOL/L (ref 21–32)
CREAT SERPL-MCNC: 1.05 MG/DL (ref 0.5–1.05)
EGFRCR SERPLBLD CKD-EPI 2021: 64 ML/MIN/1.73M*2
GLUCOSE SERPL-MCNC: 87 MG/DL (ref 74–99)
POTASSIUM SERPL-SCNC: 3.6 MMOL/L (ref 3.5–5.3)
SODIUM SERPL-SCNC: 140 MMOL/L (ref 136–145)

## 2024-03-26 PROCEDURE — 1036F TOBACCO NON-USER: CPT | Performed by: INTERNAL MEDICINE

## 2024-03-26 PROCEDURE — 80048 BASIC METABOLIC PNL TOTAL CA: CPT

## 2024-03-26 PROCEDURE — 3074F SYST BP LT 130 MM HG: CPT | Performed by: INTERNAL MEDICINE

## 2024-03-26 PROCEDURE — 99213 OFFICE O/P EST LOW 20 MIN: CPT | Performed by: INTERNAL MEDICINE

## 2024-03-26 PROCEDURE — 36415 COLL VENOUS BLD VENIPUNCTURE: CPT

## 2024-03-26 PROCEDURE — 3079F DIAST BP 80-89 MM HG: CPT | Performed by: INTERNAL MEDICINE

## 2024-03-26 ASSESSMENT — ENCOUNTER SYMPTOMS
HEADACHES: 0
NECK PAIN: 0
SWEATS: 0
LIGHT-HEADEDNESS: 0
FACIAL SWELLING: 0
EYE PAIN: 0
HEMATURIA: 0
BRUISES/BLEEDS EASILY: 0
FREQUENCY: 0
VOMITING: 0
HYPERTENSION: 1
SEIZURES: 0
EYE DISCHARGE: 0
PALPITATIONS: 0
TROUBLE SWALLOWING: 0
MYALGIAS: 0
VOICE CHANGE: 0
BACK PAIN: 0
RHINORRHEA: 0
SINUS PAIN: 0
PHOTOPHOBIA: 0
FATIGUE: 0
ADENOPATHY: 0
DIAPHORESIS: 0
DIARRHEA: 0
SPEECH DIFFICULTY: 0
ANAL BLEEDING: 0
POLYPHAGIA: 0
ABDOMINAL DISTENTION: 0
PND: 0
NECK STIFFNESS: 0
SHORTNESS OF BREATH: 0
EYE ITCHING: 0
SORE THROAT: 0
BLURRED VISION: 0
COUGH: 1
RECTAL PAIN: 0
DIZZINESS: 0
POLYDIPSIA: 0
ARTHRALGIAS: 0
DIFFICULTY URINATING: 0
ABDOMINAL PAIN: 0
APPETITE CHANGE: 0
COLOR CHANGE: 0
NAUSEA: 0
FACIAL ASYMMETRY: 0
BLOOD IN STOOL: 0
TREMORS: 0
CONSTIPATION: 0
SINUS PRESSURE: 0
WOUND: 0
DYSURIA: 0
SLEEP DISTURBANCE: 0
JOINT SWELLING: 0
WEAKNESS: 0
ORTHOPNEA: 0
ACTIVITY CHANGE: 0
FLANK PAIN: 0
EYE REDNESS: 0
CHILLS: 0
STRIDOR: 0
WHEEZING: 0
CHOKING: 0
NUMBNESS: 0
CHEST TIGHTNESS: 0

## 2024-03-26 ASSESSMENT — PAIN SCALES - GENERAL: PAINLEVEL: 0-NO PAIN

## 2024-03-26 NOTE — PROGRESS NOTES
Subjective   Patient ID: Ruby Escoto is a 53 y.o. female who presents for Follow-up (BLOOD PRESSURE MEDICATION CHECK).    Patient presents for follow-up.  She has been compliant with her medications, diet and exercise.  She reports that her blood pressures been normal at home.  She has been complaining of a nonproductive cough at night only.  She denies any fever or chills.  She denies any headaches, no dizziness, no sinus problems, no chest pain or shortness of breath.  She denies abdominal pain no nausea vomiting diarrhea.    Hypertension  This is a chronic problem. The current episode started more than 1 year ago. The problem has been gradually improving since onset. The problem is controlled. Associated symptoms include malaise/fatigue. Pertinent negatives include no anxiety, blurred vision, chest pain, headaches, neck pain, orthopnea, palpitations, peripheral edema, PND, shortness of breath or sweats. Agents associated with hypertension include oral contraceptives. Risk factors for coronary artery disease include family history, obesity and stress. Compliance problems include psychosocial issues.         Review of Systems   Constitutional:  Positive for malaise/fatigue. Negative for activity change, appetite change, chills, diaphoresis and fatigue.   HENT:  Negative for congestion, dental problem, drooling, ear discharge, ear pain, facial swelling, hearing loss, mouth sores, nosebleeds, postnasal drip, rhinorrhea, sinus pressure, sinus pain, sneezing, sore throat, tinnitus, trouble swallowing and voice change.    Eyes:  Negative for blurred vision, photophobia, pain, discharge, redness, itching and visual disturbance.   Respiratory:  Positive for cough. Negative for choking, chest tightness, shortness of breath, wheezing and stridor.    Cardiovascular:  Negative for chest pain, palpitations, orthopnea, leg swelling and PND.   Gastrointestinal:  Negative for abdominal distention, abdominal pain, anal  bleeding, blood in stool, constipation, diarrhea, nausea, rectal pain and vomiting.   Endocrine: Negative for cold intolerance, heat intolerance, polydipsia, polyphagia and polyuria.   Genitourinary:  Negative for decreased urine volume, difficulty urinating, dysuria, enuresis, flank pain, frequency, genital sores, hematuria and urgency.   Musculoskeletal:  Negative for arthralgias, back pain, gait problem, joint swelling, myalgias, neck pain and neck stiffness.   Skin:  Negative for color change, pallor, rash and wound.   Neurological:  Negative for dizziness, tremors, seizures, syncope, facial asymmetry, speech difficulty, weakness, light-headedness, numbness and headaches.   Hematological:  Negative for adenopathy. Does not bruise/bleed easily.   Psychiatric/Behavioral:  Negative for sleep disturbance.        Objective   /82   Pulse 68   Wt 86.2 kg (190 lb)   LMP 02/14/2024   BMI 30.67 kg/m²     Physical Exam  Constitutional:       Appearance: Normal appearance.   Cardiovascular:      Rate and Rhythm: Normal rate and regular rhythm.      Heart sounds: No murmur heard.     No gallop.   Pulmonary:      Effort: No respiratory distress.      Breath sounds: No wheezing or rales.   Abdominal:      General: There is no distension.      Palpations: There is no mass.      Tenderness: There is no abdominal tenderness. There is no guarding.   Musculoskeletal:      Right lower leg: No edema.      Left lower leg: No edema.   Neurological:      Mental Status: She is alert.         Assessment/Plan   Diagnoses and all orders for this visit:  Benign essential hypertension-she will follow a low-salt diet and exercise.  -     Basic Metabolic Panel; Future  Hyperlipemia, mixed-she will continue with diet and exercise  Renal insufficiency-recheck creatinine.  Health maintenance-mammogram has been done.  Cologuard is up-to-date.  Immunizations are up-to-date.  Pap with GYN.  Obesity-she will diet and exercise.

## 2024-04-01 ENCOUNTER — TELEPHONE (OUTPATIENT)
Dept: OBSTETRICS AND GYNECOLOGY | Facility: CLINIC | Age: 54
End: 2024-04-01
Payer: COMMERCIAL

## 2024-04-01 NOTE — TELEPHONE ENCOUNTER
Contacted pt and verified name and .  Pt notified per Dr. Guardado, pt is to repeat pap in 1 year.  Pt states understanding and denies having questions at this time.

## 2024-06-21 ENCOUNTER — OFFICE VISIT (OUTPATIENT)
Dept: PRIMARY CARE | Facility: CLINIC | Age: 54
End: 2024-06-21
Payer: COMMERCIAL

## 2024-06-21 ENCOUNTER — LAB (OUTPATIENT)
Dept: LAB | Facility: LAB | Age: 54
End: 2024-06-21
Payer: COMMERCIAL

## 2024-06-21 VITALS
WEIGHT: 189.2 LBS | DIASTOLIC BLOOD PRESSURE: 82 MMHG | SYSTOLIC BLOOD PRESSURE: 124 MMHG | HEART RATE: 68 BPM | BODY MASS INDEX: 30.54 KG/M2

## 2024-06-21 DIAGNOSIS — I10 BENIGN ESSENTIAL HYPERTENSION: ICD-10-CM

## 2024-06-21 DIAGNOSIS — Z00.00 HEALTH CARE MAINTENANCE: ICD-10-CM

## 2024-06-21 DIAGNOSIS — Z12.31 SCREENING MAMMOGRAM FOR BREAST CANCER: ICD-10-CM

## 2024-06-21 DIAGNOSIS — N28.9 RENAL INSUFFICIENCY: ICD-10-CM

## 2024-06-21 DIAGNOSIS — Z12.11 SCREENING FOR COLON CANCER: Primary | ICD-10-CM

## 2024-06-21 DIAGNOSIS — E78.2 HYPERLIPEMIA, MIXED: ICD-10-CM

## 2024-06-21 LAB
25(OH)D3 SERPL-MCNC: 48 NG/ML (ref 30–100)
ALBUMIN SERPL BCP-MCNC: 4.8 G/DL (ref 3.4–5)
ALP SERPL-CCNC: 72 U/L (ref 33–110)
ALT SERPL W P-5'-P-CCNC: 15 U/L (ref 7–45)
ANION GAP SERPL CALC-SCNC: 16 MMOL/L (ref 10–20)
APPEARANCE UR: CLEAR
AST SERPL W P-5'-P-CCNC: 17 U/L (ref 9–39)
BASOPHILS # BLD AUTO: 0.04 X10*3/UL (ref 0–0.1)
BASOPHILS NFR BLD AUTO: 0.6 %
BILIRUB SERPL-MCNC: 0.7 MG/DL (ref 0–1.2)
BILIRUB UR STRIP.AUTO-MCNC: NEGATIVE MG/DL
BUN SERPL-MCNC: 16 MG/DL (ref 6–23)
CALCIUM SERPL-MCNC: 10 MG/DL (ref 8.6–10.6)
CHLORIDE SERPL-SCNC: 102 MMOL/L (ref 98–107)
CHOLEST SERPL-MCNC: 149 MG/DL (ref 0–199)
CHOLESTEROL/HDL RATIO: 3.4
CO2 SERPL-SCNC: 25 MMOL/L (ref 21–32)
COLOR UR: YELLOW
CREAT SERPL-MCNC: 1.11 MG/DL (ref 0.5–1.05)
CREAT UR-MCNC: 97.1 MG/DL (ref 20–320)
EGFRCR SERPLBLD CKD-EPI 2021: 60 ML/MIN/1.73M*2
EOSINOPHIL # BLD AUTO: 0.03 X10*3/UL (ref 0–0.7)
EOSINOPHIL NFR BLD AUTO: 0.5 %
ERYTHROCYTE [DISTWIDTH] IN BLOOD BY AUTOMATED COUNT: 13.8 % (ref 11.5–14.5)
GLUCOSE SERPL-MCNC: 92 MG/DL (ref 74–99)
GLUCOSE UR STRIP.AUTO-MCNC: NORMAL MG/DL
HCT VFR BLD AUTO: 41.6 % (ref 36–46)
HDLC SERPL-MCNC: 44.1 MG/DL
HGB BLD-MCNC: 13.7 G/DL (ref 12–16)
IMM GRANULOCYTES # BLD AUTO: 0.03 X10*3/UL (ref 0–0.7)
IMM GRANULOCYTES NFR BLD AUTO: 0.5 % (ref 0–0.9)
KETONES UR STRIP.AUTO-MCNC: NEGATIVE MG/DL
LDLC SERPL CALC-MCNC: 92 MG/DL
LEUKOCYTE ESTERASE UR QL STRIP.AUTO: NEGATIVE
LYMPHOCYTES # BLD AUTO: 1.98 X10*3/UL (ref 1.2–4.8)
LYMPHOCYTES NFR BLD AUTO: 30.5 %
MCH RBC QN AUTO: 29.7 PG (ref 26–34)
MCHC RBC AUTO-ENTMCNC: 32.9 G/DL (ref 32–36)
MCV RBC AUTO: 90 FL (ref 80–100)
MICROALBUMIN UR-MCNC: <7 MG/L
MICROALBUMIN/CREAT UR: NORMAL MG/G{CREAT}
MONOCYTES # BLD AUTO: 0.54 X10*3/UL (ref 0.1–1)
MONOCYTES NFR BLD AUTO: 8.3 %
NEUTROPHILS # BLD AUTO: 3.88 X10*3/UL (ref 1.2–7.7)
NEUTROPHILS NFR BLD AUTO: 59.6 %
NITRITE UR QL STRIP.AUTO: NEGATIVE
NON HDL CHOLESTEROL: 105 MG/DL (ref 0–149)
NRBC BLD-RTO: 0 /100 WBCS (ref 0–0)
PH UR STRIP.AUTO: 7.5 [PH]
PLATELET # BLD AUTO: 286 X10*3/UL (ref 150–450)
POTASSIUM SERPL-SCNC: 4 MMOL/L (ref 3.5–5.3)
PROT SERPL-MCNC: 7.3 G/DL (ref 6.4–8.2)
PROT UR STRIP.AUTO-MCNC: NEGATIVE MG/DL
RBC # BLD AUTO: 4.61 X10*6/UL (ref 4–5.2)
RBC # UR STRIP.AUTO: NEGATIVE /UL
SODIUM SERPL-SCNC: 139 MMOL/L (ref 136–145)
SP GR UR STRIP.AUTO: 1.02
TRIGL SERPL-MCNC: 67 MG/DL (ref 0–149)
TSH SERPL-ACNC: 1.69 MIU/L (ref 0.44–3.98)
URATE SERPL-MCNC: 6.8 MG/DL (ref 2.3–6.7)
UROBILINOGEN UR STRIP.AUTO-MCNC: NORMAL MG/DL
VLDL: 13 MG/DL (ref 0–40)
WBC # BLD AUTO: 6.5 X10*3/UL (ref 4.4–11.3)

## 2024-06-21 PROCEDURE — 84550 ASSAY OF BLOOD/URIC ACID: CPT

## 2024-06-21 PROCEDURE — 3079F DIAST BP 80-89 MM HG: CPT | Performed by: INTERNAL MEDICINE

## 2024-06-21 PROCEDURE — 1036F TOBACCO NON-USER: CPT | Performed by: INTERNAL MEDICINE

## 2024-06-21 PROCEDURE — 85025 COMPLETE CBC W/AUTO DIFF WBC: CPT

## 2024-06-21 PROCEDURE — 82043 UR ALBUMIN QUANTITATIVE: CPT

## 2024-06-21 PROCEDURE — 82570 ASSAY OF URINE CREATININE: CPT

## 2024-06-21 PROCEDURE — 82306 VITAMIN D 25 HYDROXY: CPT

## 2024-06-21 PROCEDURE — 80053 COMPREHEN METABOLIC PANEL: CPT

## 2024-06-21 PROCEDURE — 81003 URINALYSIS AUTO W/O SCOPE: CPT

## 2024-06-21 PROCEDURE — 3074F SYST BP LT 130 MM HG: CPT | Performed by: INTERNAL MEDICINE

## 2024-06-21 PROCEDURE — 99213 OFFICE O/P EST LOW 20 MIN: CPT | Performed by: INTERNAL MEDICINE

## 2024-06-21 PROCEDURE — 84443 ASSAY THYROID STIM HORMONE: CPT

## 2024-06-21 PROCEDURE — 36415 COLL VENOUS BLD VENIPUNCTURE: CPT

## 2024-06-21 PROCEDURE — 80061 LIPID PANEL: CPT

## 2024-06-21 ASSESSMENT — ENCOUNTER SYMPTOMS
NECK PAIN: 1
ANAL BLEEDING: 0
NECK PAIN: 0
SINUS PRESSURE: 0
ABDOMINAL PAIN: 0
EYE PAIN: 0
POLYDIPSIA: 0
LIGHT-HEADEDNESS: 0
COUGH: 0
FATIGUE: 0
APPETITE CHANGE: 0
EYE REDNESS: 0
NAUSEA: 0
EYE ITCHING: 0
FLANK PAIN: 0
ORTHOPNEA: 0
SWEATS: 0
DIARRHEA: 0
WOUND: 0
PND: 0
COLOR CHANGE: 0
STRIDOR: 0
PALPITATIONS: 0
MYALGIAS: 0
PHOTOPHOBIA: 0
SEIZURES: 0
BLURRED VISION: 0
BRUISES/BLEEDS EASILY: 0
WEAKNESS: 0
SPEECH DIFFICULTY: 0
ARTHRALGIAS: 0
POLYPHAGIA: 0
HEADACHES: 0
NECK STIFFNESS: 0
WHEEZING: 0
BACK PAIN: 0
DYSURIA: 0
VOICE CHANGE: 0
DIAPHORESIS: 0
CONSTIPATION: 0
DIZZINESS: 0
VOMITING: 0
APNEA: 0
FREQUENCY: 0
TREMORS: 0
SINUS PAIN: 0
CHEST TIGHTNESS: 0
NUMBNESS: 0
ADENOPATHY: 0
HYPERTENSION: 1
TROUBLE SWALLOWING: 0
CHOKING: 0
RECTAL PAIN: 0
CHILLS: 0
FACIAL SWELLING: 0
BLOOD IN STOOL: 0
FACIAL ASYMMETRY: 0
RHINORRHEA: 0
SHORTNESS OF BREATH: 0
EYE DISCHARGE: 0
ACTIVITY CHANGE: 0
AGITATION: 0
JOINT SWELLING: 0
SLEEP DISTURBANCE: 0
DIFFICULTY URINATING: 0
HEMATURIA: 0
SORE THROAT: 0
ABDOMINAL DISTENTION: 0

## 2024-06-21 ASSESSMENT — PAIN SCALES - GENERAL: PAINLEVEL: 4

## 2024-06-21 NOTE — PATIENT INSTRUCTIONS
Please take medication as prescribed.  Obtain fasting blood work and urine.  Schedule your colonoscopy and a mammogram.  Diet and exercise.

## 2024-06-21 NOTE — PROGRESS NOTES
Subjective   Patient ID: Ruby sEcoto is a 53 y.o. female who presents for Follow-up and Hypertension.    Patient presents for follow-up.  She has been compliant with her medications, diet and exercise.  She has been complaining of right-sided neck pain over the past 3 days.  She denies any history of trauma.  She thinks she may have slept wrong.  She otherwise feels okay.  She denies any headaches, no dizziness, no chest pain or shortness of breath.  She denies abdominal pain no nausea vomiting or diarrhea.    Hypertension  This is a recurrent problem. The current episode started more than 1 year ago. The problem has been gradually improving since onset. The problem is controlled. Associated symptoms include neck pain. Pertinent negatives include no anxiety, blurred vision, chest pain, headaches, malaise/fatigue, orthopnea, palpitations, peripheral edema, PND, shortness of breath or sweats. Agents associated with hypertension include oral contraceptives. Risk factors for coronary artery disease include family history, obesity and stress. Compliance problems include diet.         Review of Systems   Constitutional:  Negative for activity change, appetite change, chills, diaphoresis, fatigue and malaise/fatigue.   HENT:  Negative for congestion, dental problem, drooling, ear discharge, ear pain, facial swelling, hearing loss, mouth sores, nosebleeds, postnasal drip, rhinorrhea, sinus pressure, sinus pain, sneezing, sore throat, tinnitus, trouble swallowing and voice change.    Eyes:  Negative for blurred vision, photophobia, pain, discharge, redness, itching and visual disturbance.   Respiratory:  Negative for apnea, cough, choking, chest tightness, shortness of breath, wheezing and stridor.    Cardiovascular:  Negative for chest pain, palpitations, orthopnea, leg swelling and PND.   Gastrointestinal:  Negative for abdominal distention, abdominal pain, anal bleeding, blood in stool, constipation, diarrhea, nausea,  rectal pain and vomiting.   Endocrine: Negative for cold intolerance, heat intolerance, polydipsia, polyphagia and polyuria.   Genitourinary:  Negative for decreased urine volume, difficulty urinating, dysuria, enuresis, flank pain, frequency, genital sores, hematuria and urgency.   Musculoskeletal:  Positive for neck pain. Negative for arthralgias, back pain, gait problem, joint swelling, myalgias and neck stiffness.   Skin:  Negative for color change, pallor, rash and wound.   Neurological:  Negative for dizziness, tremors, seizures, syncope, facial asymmetry, speech difficulty, weakness, light-headedness, numbness and headaches.   Hematological:  Negative for adenopathy. Does not bruise/bleed easily.   Psychiatric/Behavioral:  Negative for agitation and sleep disturbance.        Objective   /82   Pulse 68   Wt 85.8 kg (189 lb 3.2 oz)   BMI 30.54 kg/m²     Physical Exam  Constitutional:       Appearance: Normal appearance.   Cardiovascular:      Rate and Rhythm: Normal rate and regular rhythm.      Heart sounds: No murmur heard.     No gallop.   Pulmonary:      Effort: No respiratory distress.      Breath sounds: No wheezing or rales.   Abdominal:      General: There is no distension.      Palpations: There is no mass.      Tenderness: There is no abdominal tenderness. There is no guarding.   Musculoskeletal:      Right lower leg: No edema.      Left lower leg: No edema.   Neurological:      Mental Status: She is alert.         Assessment/Plan   Diagnoses and all orders for this visit:  Screening for colon cancer  -     Colonoscopy Screening; Average Risk Patient; Future  Health care maintenance-she will schedule mammogram.  She is requesting a colonoscopy.  Pap with GYN.  Eye and dental appointments have been done.  Immunizations are up-to-date  -     CBC and Auto Differential; Future  -     Vitamin D 25-Hydroxy,Total (for eval of Vitamin D levels); Future  -     TSH with reflex to Free T4 if abnormal;  Future  -     Uric Acid; Future  -     Urinalysis with Reflex Microscopic; Future  -     Albumin , Urine Random; Future  -     Comprehensive Metabolic Panel; Future  -     Lipid Panel; Future  Screening mammogram for breast cancer  -     BI mammo bilateral screening tomosynthesis; Future  Hyperlipemia, mixed-we will check a fasting lipid profile.  Benign essential hypertension-stable on present medications  Renal insufficiency-will recheck creatinine

## 2024-06-26 ENCOUNTER — APPOINTMENT (OUTPATIENT)
Dept: PRIMARY CARE | Facility: CLINIC | Age: 54
End: 2024-06-26
Payer: COMMERCIAL

## 2024-07-06 DIAGNOSIS — N93.9 ABNORMAL UTERINE BLEEDING (AUB): ICD-10-CM

## 2024-07-09 RX ORDER — DROSPIRENONE 4 MG/1
1 TABLET, FILM COATED ORAL DAILY
Qty: 84 TABLET | Refills: 1 | Status: SHIPPED | OUTPATIENT
Start: 2024-07-09

## 2024-07-12 ENCOUNTER — APPOINTMENT (OUTPATIENT)
Dept: RADIOLOGY | Facility: CLINIC | Age: 54
End: 2024-07-12
Payer: COMMERCIAL

## 2024-07-29 ENCOUNTER — APPOINTMENT (OUTPATIENT)
Dept: SLEEP MEDICINE | Facility: CLINIC | Age: 54
End: 2024-07-29
Payer: COMMERCIAL

## 2024-08-07 ENCOUNTER — APPOINTMENT (OUTPATIENT)
Dept: SLEEP MEDICINE | Facility: CLINIC | Age: 54
End: 2024-08-07
Payer: COMMERCIAL

## 2024-08-07 VITALS
BODY MASS INDEX: 31.08 KG/M2 | WEIGHT: 193.4 LBS | HEIGHT: 66 IN | DIASTOLIC BLOOD PRESSURE: 74 MMHG | TEMPERATURE: 97.9 F | SYSTOLIC BLOOD PRESSURE: 120 MMHG | HEART RATE: 80 BPM

## 2024-08-07 DIAGNOSIS — G47.33 OBSTRUCTIVE SLEEP APNEA SYNDROME: Primary | ICD-10-CM

## 2024-08-07 PROCEDURE — G2211 COMPLEX E/M VISIT ADD ON: HCPCS | Performed by: NURSE PRACTITIONER

## 2024-08-07 PROCEDURE — 1036F TOBACCO NON-USER: CPT | Performed by: NURSE PRACTITIONER

## 2024-08-07 PROCEDURE — 3078F DIAST BP <80 MM HG: CPT | Performed by: NURSE PRACTITIONER

## 2024-08-07 PROCEDURE — 3074F SYST BP LT 130 MM HG: CPT | Performed by: NURSE PRACTITIONER

## 2024-08-07 PROCEDURE — 99204 OFFICE O/P NEW MOD 45 MIN: CPT | Performed by: NURSE PRACTITIONER

## 2024-08-07 PROCEDURE — 3008F BODY MASS INDEX DOCD: CPT | Performed by: NURSE PRACTITIONER

## 2024-08-07 ASSESSMENT — SLEEP AND FATIGUE QUESTIONNAIRES
HOW LIKELY ARE YOU TO NOD OFF OR FALL ASLEEP WHILE SITTING AND READING: MODERATE CHANCE OF DOZING
ESS-CHAD TOTAL SCORE: 11
HOW LIKELY ARE YOU TO NOD OFF OR FALL ASLEEP WHILE SITTING QUIETLY AFTER LUNCH WITHOUT ALCOHOL: SLIGHT CHANCE OF DOZING
HOW LIKELY ARE YOU TO NOD OFF OR FALL ASLEEP WHILE WATCHING TV: MODERATE CHANCE OF DOZING
HOW LIKELY ARE YOU TO NOD OFF OR FALL ASLEEP WHEN YOU ARE A PASSENGER IN A CAR FOR AN HOUR WITHOUT A BREAK: SLIGHT CHANCE OF DOZING
HOW LIKELY ARE YOU TO NOD OFF OR FALL ASLEEP WHILE LYING DOWN TO REST IN THE AFTERNOON WHEN CIRCUMSTANCES PERMIT: HIGH CHANCE OF DOZING
SITING INACTIVE IN A PUBLIC PLACE LIKE A CLASS ROOM OR A MOVIE THEATER: SLIGHT CHANCE OF DOZING
HOW LIKELY ARE YOU TO NOD OFF OR FALL ASLEEP IN A CAR, WHILE STOPPED FOR A FEW MINUTES IN TRAFFIC: SLIGHT CHANCE OF DOZING
HOW LIKELY ARE YOU TO NOD OFF OR FALL ASLEEP WHILE SITTING AND TALKING TO SOMEONE: WOULD NEVER DOZE

## 2024-08-07 NOTE — PATIENT INSTRUCTIONS
Oral Appliance Therapy    Oral appliance therapy is a dental device that can be fabricated by a dental sleep medicine specialist. We discussed that we will refer you for an evaluation.    Please schedule an appointment with dentistry to evaluate you for an oral appliance to treat your sleep apnea. Consider the following specialists in the area:     Dr. Eugene Lynch  /[facility]: [phone]    Dr. Jorge Choudhury  [facility]   [phone]    Dr. Blair Wakefield  [place], [place]: [phone]    Dr. Robin Reyes  [place], [place]: [phone]    Dr. Marc Burch, [place]: [phone]    Dr. Imani De La Cruz, [phone]    Dr. Sid Griffith, [place]: [phone]    Dr. Chaitanya Burnett, [place]: [phone]    Dr. Timur Dow, [place]: [phone]    Dr. Roderick Weinberg, [place]: [phone]    [facility]:  Dr. Haris Blackwell    Oral appliance therapy is typically covered by your medical insurance as sleep apnea is a medical diagnoses and not a dental diagnosis. You can confirm coverage by calling your medical insurance and providing them with the following medical codes:    Diagnosis code: Obstructive Sleep Apnea G47.33  Procedure code: [id number]    After your appliance is made and adjusted, we like to make sure it is treating your sleep apnea effectively. Please return to our clinic at that time for follow up.            [facility]  DO [address]  [address] [facility] [address]       NAME: Ruby Escoto   DATE:  08/07/24    DIAGNOSIS:   No diagnosis found.    Thank you for coming to the Sleep Medicine Clinic today! Your sleep medicine provider today was: FRANCISCO JAVIER Sandoval-CNP Below is a summary of your treatment plan, other important information, and our contact numbers:    TREATMENT PLAN:   - Follow-up in 2-3 months.  - If not already done, sign up for 'My Chart' and send prescription  requests or messages through this    Instructions - Common LEXY Recs: - For your sleep apnea, continue to use your PAP every night and use it whenever you are sleeping.   - Avoid alcohol or sedatives several hours prior to sleeping.   - Get additional supplies for your PAP (e.g., mask, hose, filters) every 3 months or as your insurance allows from your [employer] company. Replacement cushions for your PAP mask can be requested monthly if airseals are an issue.  - Remember to clean your mask, tubings, and water chamber regularly as instructed.  - Avoid driving or operating heavy machinery when drowsy. A person driving while sleepy is five (5) times more likely to have an accident. If you feel sleepy, pull over and take a short power nap (sleep for less than 30 minutes). Otherwise, ask somebody to drive you.    EASY WAYS TO IMPROVE YOUR SLEEP:  1. Go to bed and wake up at the same time every day.   Aim for 8 hours but some people need less, some need more.   Get out of bed if you are not sleeping.   Limit naps to 20 min or less.   2. Expose yourself to daylight and/ or bright light in the morning.   Go outside or spend time near a window each morning.   You can use a light box (found on Amazon) if you wake before the sunrise.   Limit light exposure in the evenings (including electronic usage).   Try meditation, reading, stretching, deep breathing, warm shower or bath, or yoga nidra as part of your bedtime routine. There are many great FREE, videos or audio tracks on YouTube/ Mi[employer], etc for guidance.  3. Exercise, in some form, EVERY day, but not too close to bedtime. Consider making this part of your routine at the start of your day, followed by a cool shower.  4. Eat meals at roughly the same time every day. Make sure you are prioritizing fruits, vegetables, whole grains, lean proteins.  5. Time your caffeine intake. Make sure you are not drinking caffeine within 8 to 12 hours prior to your bedtime.   6. Avoid marijuana,  alcohol, and nicotine. They will reduce sleep quality in any quantity.  7. Learn to manage anxiety. Psychology services at  can be reached at [phone] to schedule an appointment.     IMPORTANT INFORMATION:     Call 911 for medical emergencies.  Our offices are generally open from Monday-Friday, 9 am - 5 pm.  If you need to get in touch with me, you may either call me and my team(number is below) or you can use [employer].  If a referral for a test, for CPAP, or for another specialist was made, and you have not heard about scheduling this within a week, please call scheduling at [phone]).  If you are unable to make your appointment for clinic or an overnight study, kindly call the office at least 48 hours in advance to cancel and reschedule.  If you are on CPAP, please bring your device's card to each clinic appointment unless told otherwise by your provider.  There are no supporting services by either the sleep doctors or their staff on weekends and Holidays, or after 5 PM on weekdays.   If you have been asked to come to a sleep study, make sure you bring toiletries, a comfy pillow, and any nighttime medications that you may regularly take. Also be sure to eat dinner before you arrive. We generally do not provide meals.      PRESCRIPTIONS:  We require 7 days advanced notice for prescription refills. If we do not receive the request in this time, we cannot guarantee that your medication will be refilled in time. Please contact the sleep nurses listed below for refills or request via [employer].     IMPORTANT PHONE NUMBERS:   Sleep Medicine Clinic Fax: [phone]  Appointments (for Pediatric Sleep Clinic): [phone] - option 1  Appointments (for Adult Sleep Clinic): [phone] - option 2  Appointments (For Sleep Studies): [phone] - option 3  Behavioral Sleep Medicine: [phone]  Sleep Surgery: [phone]  ENT (Otolaryngology): [phone]  Headache Clinic (Neurology):  [phone]  Neurology: [phone]  Psychiatry: [phone]  Pulmonary Function Testing (PFT) Center: [phone]  Pulmonary Medicine: [phone]  [place] (DME): [phone]  [employer] (DME): [phone]  [facility] ([facility] [phone]    Our Adult Sleep Medicine Team (Please do not hesitate to call the office or sleep nurse with any questions between appointments):    Adult Sleep Nurses (Jordana Dallas, CR and Mayra Beavers RN):  For clinical questions and refilling prescriptions: [phone]  Email sleep diaries and other documents at: [email or web address]    Adult Sleep Medicine Secretaries:  Ladi Freeman (For Monica/Galicia/Krise/Binu/Yeh): P: [phone]  F: [phone]  Bradly Collier ([place])Office: [phone] option 4 Office Fax: [phone]  Rosa Pacheco (For Veliz): P: [phone]  Fax: [phone]  Sinai Kent (For Jur[place]rina/Blank): P: [phone]  F: [phone]  Deena Salazar (For Dayan): P: [phone]  F: [phone]  Bernie Mccann (For Emily/Lisa/Sharif): P: [phone]  F: [phone]  Gale Virgen (For Ronald/Adeel): P: [phone]  F: [phone]     Adult Sleep Medicine Advanced Practice Providers:  Gary Rodriguez ([place], [place])  Crystal Gonzalez ([facility])  Afshan Calderon CNP ([place], [place], [facility])  Yazmin Zamora CNP ([place], [place], [facility])  Hilda Hanley ([place], [place], [facility])  Jose Luis Denis CNP (Critical access hospital)      Our Sleep Testing Center (STC) Locations:  Our team will contact you to schedule your sleep study, however, you can contact us as follow:  Main Phone Line (scheduling only): [phone]), option 3  Adult and Pediatric Locations  [facility] (6 years and older): [facility] - 4th floor ([address] [place] [address]) After hours line: [phone]  [facility] (Main campus:  "All ages): [facility], 6th floor. After hours line: [phone]   [place] (5 years and older; younger considered on case-by-case basis): [address]; [address], [address]. Scheduling  After hours line: [phone]   [place] (6 years and older): [address]; [address]; [address]   [place] (6 years and older): [address] [facility], [address]  After hours line: [phone]   [place] (13 years and older) in [place]: [address], 2nd floor  After hours line: [phone]   [place] (13 year and older): [address]  After hours line: [phone] (Home studies out of [facility])    Adult Only Locations:   [place] (18 years and older): [address] [facility], 2nd floor   [place] (18 years and older): [address] [facility]; 4th floor  After hours line: [phone]  [facility] (18 years and older) at [place]: [address] [facility]  After hours line: [phone]        CONTACTING YOUR SLEEP MEDICINE PROVIDER:  Send a message directly to your provider through \"My Chart\", which is the email service through your  Records Account: [email or web address]   Call [phone] and leave a message. One of the administrative assistants will forward the message to your sleep medicine provider through \"My Chart\" and/or email.     Your sleep medicine provider for this visit was: Afshan Calderon, FRANCISCO JAVIER-CNP    In the event that you are running more than 15 minutes late to your appointment, I will kindly ask you to reschedule.     "

## 2024-08-07 NOTE — PROGRESS NOTES
Patient: Ruby Escoto    [id number]  : 1970 -- AGE 53 y.o.    Provider: FLORENCIA Sandoval     Location [facility]   Service Date: 2024              [facility]  New Visit Note      HISTORY OF PRESENT ILLNESS     The patient's referring provider is: Dr. Bishop     HISTORY OF PRESENT ILLNESS   Ruby Escoto is a 53 y.o. female who presents to a [facility] for a sleep medicine evaluation with concerns of LEXY, positive sleep study.     The patient has h/o HTN, hyperlipidemia, vitamin D deficiency, fibroids.    Past Sleep History  Patient has had home sleep study 2023 showing mild to moderate sleep apnea with a AHI 3% 16.3, AHI 4% 7.2 and SpO2 heron of 78.3. She has not started treatment.     Current History    On today's visit, the patient reports ongoing snoring, recent witnessed apneas, daytime sleepiness. Completed sleep testing in Dec at recommendation of family. Has done some research on treatment options. Is interested in dental appliance but would be willing to use CPAP if needed.     Sleep schedule  on weekdays / work days:  Usual Bedtime:    10:30 PM  Falls asleep around:  15 min  # of awakenings:   Wake time:  5:30 AM    Naps: none    Sleep schedule on weekends/non work days :  Usual Bedtime:    varies  Falls asleep around:  varies  # of awakenings:   Wake time:  7 AM    Naps: 1-2 hours on sat or  varies, sometimes refreshing     Preferred sleeping position: side, has adjustable base mattress now     Sleep-related ROS:    Problems going to sleep: No problems going to sleep    Problems staying asleep Problems Staying Asleep: nocturia and breathing problems    Breathing during sleep: snoring, snorting during sleep, witnessed apneas, and night sweats    Daytime Symptoms  On awakening patient reports: wake unrefreshed and morning dry mouth    RLS screen:  RLSSCREEN: - Sensations: Patient does not  have unusual sensations in their extremities that cause an urge to move them     Sleep-related behaviors:   dreams upon falling asleep    Fatigue: symptoms bothersome, but easily able to carry out all usual work/school/family activities  Notes fatigue during the day, trouble staying focused, sleepy driving.     ESS: 11   RELL:    FOSQ:  --      REVIEW OF SYSTEMS     REVIEW OF SYSTEMS  Review of Systems   All other systems reviewed and are negative.      ALLERGIES AND MEDICATIONS     ALLERGIES  Allergies   Allergen Reactions    Amoxicillin Hives       MEDICATIONS  Current Outpatient Medications   Medication Sig Dispense Refill    amLODIPine (Norvasc) 10 mg tablet TAKE 1 TABLET BY MOUTH EVERY DAY 90 tablet 2    atorvastatin (Lipitor) 10 mg tablet TAKE 1 TABLET BY MOUTH EVERY DAY 90 tablet 1    Slynd 4 mg (28) tablet TAKE 1 TABLET BY MOUTH EVERY DAY 84 tablet 1    triamterene-hydrochlorothiazid (Maxzide) 75-50 mg tablet TAKE 1 TABLET BY MOUTH EVERY DAY *90 DAYS REQUIRED, BUT YOU NEED AN APPT* 90 tablet 1     No current facility-administered medications for this visit.         PAST HISTORY     PAST MEDICAL HISTORY  History reviewed. No pertinent past medical history.    PAST SURGICAL HISTORY:  Past Surgical History:   Procedure Laterality Date    OTHER SURGICAL HISTORY  11/19/2021    No history of surgery       FAMILY HISTORY  Family History   Problem Relation Name Age of Onset    Hypertension Mother      Endometrial cancer Mother      Hypertension Father         DOES/DOES NOT EC: does not have a family history of sleep disorder. Parents snore and nephew snores, no one else tested yet.     SOCIAL HISTORY  She  reports that she has never smoked. She has never used smokeless tobacco. She reports current alcohol use of about 1.0 standard drink of alcohol per week. She reports that she does not use drugs. She currently lives with her .     Caffeine consumption: daily 1x   Alcohol consumption: 2x week  Smoking:  "none  Marijuana: none    PHYSICAL EXAM     VITAL SIGNS: /74 (BP Location: Right arm, Patient Position: Sitting, BP Cuff Size: Adult)   Pulse 80   Temp 36.6 °C (97.9 °F) (Temporal)   Ht 1.676 m (5' 6\")   Wt 87.7 kg (193 lb 6.4 oz)   BMI 31.22 kg/m²      PREVIOUS WEIGHTS:  Wt Readings from Last 3 Encounters:   08/07/24 87.7 kg (193 lb 6.4 oz)   06/21/24 85.8 kg (189 lb 3.2 oz)   03/26/24 86.2 kg (190 lb)       Physical Exam  Physical Exam   Constitutional: Alert and oriented, cooperative, no obvious distress   HEENT: Non icteric or anemic, EOM WNL bilaterally     Upper Airway Examination:   Modified Mallampati Class: 3  OP Lateral wall narrowing stgstrstastdstest:st st1st Tonsil ndGndrndanddndend:nd nd2nd No high arched palate  Tongue Scalloping: N  Retrognathia: N  Overjet: N    Neck: Supple, no JVD, no goiter, no adenopathy  Chest: CTA bilaterally, no wheezing, crackles, rubs   Cardiac: RRR, S1 and S2, no murmur, rub, thrill   Abdomen: Obese, Soft, nontender, no masses, no organomegaly   Extremities: No clubbing, no LL edema   Neuromuscular: Cranial nerves grossly intact, no focal deficits        RESULTS/DATA     Bicarbonate (mmol/L)   Date Value   06/21/2024 25   03/26/2024 30   01/13/2024 29       No results found for this or any previous visit from the past 365 days.       Failed to redirect to the Timeline version of the [facility] [employer].        ASSESSMENT/PLAN     Ms. Escoto is a 53 y.o. female and She was referred to the [facility] for evaluation of LEXY    Problem List and Orders  Problem List Items Addressed This Visit             ICD-10-CM    Apnea, sleep - Primary G47.30     Moderate sleep apnea on HSAT  Severe in supine sleep --> mild in nonsupine sleep  Interested in dental appliance. Information provided for dentists in area. Requested she let me know if she wants to move forward with this  Consider CPAP as needed as alternative  RTC in 3-4 mo for CPAP                      "

## 2024-08-07 NOTE — ASSESSMENT & PLAN NOTE
Moderate sleep apnea on HSAT  Severe in supine sleep --> mild in nonsupine sleep  Interested in dental appliance. Information provided for dentists in area. Requested she let me know if she wants to move forward with this  Consider CPAP as needed as alternative  RTC in 3-4 mo for CPAP

## 2024-09-06 DIAGNOSIS — I10 BENIGN ESSENTIAL HYPERTENSION: ICD-10-CM

## 2024-09-06 DIAGNOSIS — E78.2 MIXED HYPERLIPIDEMIA: ICD-10-CM

## 2024-09-06 RX ORDER — TRIAMTERENE AND HYDROCHLOROTHIAZIDE 75; 50 MG/1; MG/1
TABLET ORAL
Qty: 90 TABLET | Refills: 3 | Status: SHIPPED | OUTPATIENT
Start: 2024-09-06

## 2024-09-06 RX ORDER — ATORVASTATIN CALCIUM 10 MG/1
10 TABLET, FILM COATED ORAL DAILY
Qty: 90 TABLET | Refills: 3 | Status: SHIPPED | OUTPATIENT
Start: 2024-09-06

## 2024-11-16 DIAGNOSIS — I10 BENIGN ESSENTIAL HYPERTENSION: ICD-10-CM

## 2024-11-18 RX ORDER — AMLODIPINE BESYLATE 10 MG/1
TABLET ORAL
Qty: 90 TABLET | Refills: 3 | Status: SHIPPED | OUTPATIENT
Start: 2024-11-18

## 2024-12-04 ENCOUNTER — APPOINTMENT (OUTPATIENT)
Dept: SLEEP MEDICINE | Facility: CLINIC | Age: 54
End: 2024-12-04
Payer: COMMERCIAL

## 2024-12-12 ENCOUNTER — APPOINTMENT (OUTPATIENT)
Dept: PRIMARY CARE | Facility: CLINIC | Age: 54
End: 2024-12-12
Payer: COMMERCIAL

## 2024-12-17 ENCOUNTER — APPOINTMENT (OUTPATIENT)
Dept: PRIMARY CARE | Facility: CLINIC | Age: 54
End: 2024-12-17
Payer: COMMERCIAL

## 2025-01-09 ENCOUNTER — APPOINTMENT (OUTPATIENT)
Dept: PRIMARY CARE | Facility: CLINIC | Age: 55
End: 2025-01-09
Payer: COMMERCIAL

## 2025-01-10 ENCOUNTER — APPOINTMENT (OUTPATIENT)
Dept: PRIMARY CARE | Facility: CLINIC | Age: 55
End: 2025-01-10
Payer: COMMERCIAL

## 2025-03-07 ENCOUNTER — APPOINTMENT (OUTPATIENT)
Dept: PRIMARY CARE | Facility: CLINIC | Age: 55
End: 2025-03-07
Payer: COMMERCIAL

## 2025-03-17 ENCOUNTER — APPOINTMENT (OUTPATIENT)
Dept: PRIMARY CARE | Facility: CLINIC | Age: 55
End: 2025-03-17
Payer: COMMERCIAL

## 2025-03-17 VITALS — WEIGHT: 190 LBS | BODY MASS INDEX: 30.67 KG/M2

## 2025-03-17 DIAGNOSIS — I10 BENIGN ESSENTIAL HYPERTENSION: ICD-10-CM

## 2025-03-17 DIAGNOSIS — Z12.31 ENCOUNTER FOR SCREENING MAMMOGRAM FOR MALIGNANT NEOPLASM OF BREAST: Primary | ICD-10-CM

## 2025-03-17 DIAGNOSIS — Z00.00 HEALTH CARE MAINTENANCE: ICD-10-CM

## 2025-03-17 DIAGNOSIS — E66.811 CLASS 1 OBESITY DUE TO EXCESS CALORIES WITHOUT SERIOUS COMORBIDITY WITH BODY MASS INDEX (BMI) OF 30.0 TO 30.9 IN ADULT: ICD-10-CM

## 2025-03-17 DIAGNOSIS — E66.09 CLASS 1 OBESITY DUE TO EXCESS CALORIES WITHOUT SERIOUS COMORBIDITY WITH BODY MASS INDEX (BMI) OF 30.0 TO 30.9 IN ADULT: ICD-10-CM

## 2025-03-17 DIAGNOSIS — R00.0 RACING HEART BEAT: ICD-10-CM

## 2025-03-17 DIAGNOSIS — Z12.11 SCREENING FOR COLON CANCER: ICD-10-CM

## 2025-03-17 PROCEDURE — 99214 OFFICE O/P EST MOD 30 MIN: CPT | Performed by: INTERNAL MEDICINE

## 2025-03-17 ASSESSMENT — ENCOUNTER SYMPTOMS
FATIGUE: 0
STRIDOR: 0
DIFFICULTY URINATING: 0
ADENOPATHY: 0
EYE DISCHARGE: 0
ANAL BLEEDING: 0
DYSURIA: 0
COUGH: 1
BRUISES/BLEEDS EASILY: 0
FACIAL ASYMMETRY: 0
NAUSEA: 0
VOICE CHANGE: 0
POLYPHAGIA: 0
HEADACHES: 0
TROUBLE SWALLOWING: 0
ARTHRALGIAS: 0
POLYDIPSIA: 0
EYE ITCHING: 0
SORE THROAT: 0
EYE REDNESS: 0
DIAPHORESIS: 0
HEMATURIA: 0
SINUS PAIN: 0
BACK PAIN: 0
PHOTOPHOBIA: 0
WEAKNESS: 0
TREMORS: 0
ABDOMINAL DISTENTION: 0
NECK PAIN: 0
JOINT SWELLING: 0
VOMITING: 0
WOUND: 0
MYALGIAS: 0
APPETITE CHANGE: 0
WHEEZING: 0
SPEECH DIFFICULTY: 0
SEIZURES: 0
COLOR CHANGE: 0
LIGHT-HEADEDNESS: 0
RECTAL PAIN: 0
CHILLS: 0
FLANK PAIN: 0
BLOOD IN STOOL: 0
FREQUENCY: 0
DIZZINESS: 0
DIARRHEA: 0
SINUS PRESSURE: 0
ACTIVITY CHANGE: 0
SLEEP DISTURBANCE: 0
FACIAL SWELLING: 0
SHORTNESS OF BREATH: 0
RHINORRHEA: 1
CONSTIPATION: 0
NUMBNESS: 0
CHOKING: 0
NECK STIFFNESS: 0
ABDOMINAL PAIN: 0
CHEST TIGHTNESS: 0
EYE PAIN: 0
PALPITATIONS: 0

## 2025-03-17 ASSESSMENT — PATIENT HEALTH QUESTIONNAIRE - PHQ9
SUM OF ALL RESPONSES TO PHQ9 QUESTIONS 1 AND 2: 0
SUM OF ALL RESPONSES TO PHQ9 QUESTIONS 1 AND 2: 0
2. FEELING DOWN, DEPRESSED OR HOPELESS: NOT AT ALL
1. LITTLE INTEREST OR PLEASURE IN DOING THINGS: NOT AT ALL
2. FEELING DOWN, DEPRESSED OR HOPELESS: NOT AT ALL
1. LITTLE INTEREST OR PLEASURE IN DOING THINGS: NOT AT ALL

## 2025-03-17 ASSESSMENT — PAIN SCALES - GENERAL: PAINLEVEL_OUTOF10: 0-NO PAIN

## 2025-03-17 NOTE — PATIENT INSTRUCTIONS
Please take medication as prescribed.  Follow-up for complete physical exam.  Obtain fasting blood work.

## 2025-03-17 NOTE — PROGRESS NOTES
Subjective   Patient ID: Ruby Escoto is a 54 y.o. female who presents for Follow-up.    Patient presents for follow-up.  She has been compliant with her medications, diet and exercise.  She is currently without new complaints.  She recovering from a URI.  She denies any headaches, no dizziness, no chest pain or shortness of breath.  She denies abdominal pain no nausea vomiting or diarrhea.  She reports no new musculoskeletal complaints.         Review of Systems   Constitutional:  Negative for activity change, appetite change, chills, diaphoresis and fatigue.   HENT:  Positive for rhinorrhea. Negative for congestion, dental problem, drooling, ear discharge, ear pain, facial swelling, hearing loss, mouth sores, nosebleeds, postnasal drip, sinus pressure, sinus pain, sneezing, sore throat, tinnitus, trouble swallowing and voice change.    Eyes:  Negative for photophobia, pain, discharge, redness, itching and visual disturbance.   Respiratory:  Positive for cough. Negative for choking, chest tightness, shortness of breath, wheezing and stridor.    Cardiovascular:  Negative for chest pain, palpitations and leg swelling.   Gastrointestinal:  Negative for abdominal distention, abdominal pain, anal bleeding, blood in stool, constipation, diarrhea, nausea, rectal pain and vomiting.   Endocrine: Negative for cold intolerance, heat intolerance, polydipsia, polyphagia and polyuria.   Genitourinary:  Negative for decreased urine volume, difficulty urinating, dysuria, enuresis, flank pain, frequency, genital sores, hematuria and urgency.   Musculoskeletal:  Negative for arthralgias, back pain, gait problem, joint swelling, myalgias, neck pain and neck stiffness.   Skin:  Negative for color change, pallor, rash and wound.   Neurological:  Negative for dizziness, tremors, seizures, syncope, facial asymmetry, speech difficulty, weakness, light-headedness, numbness and headaches.   Hematological:  Negative for adenopathy. Does  not bruise/bleed easily.   Psychiatric/Behavioral:  Negative for sleep disturbance.        Objective   Wt 86.2 kg (190 lb)   BMI 30.67 kg/m²     Physical Exam  Constitutional:       Appearance: Normal appearance.   Cardiovascular:      Rate and Rhythm: Normal rate and regular rhythm.      Heart sounds: No murmur heard.     No gallop.   Pulmonary:      Effort: No respiratory distress.      Breath sounds: No wheezing or rales.   Abdominal:      General: There is no distension.      Palpations: There is no mass.      Tenderness: There is no abdominal tenderness. There is no guarding.   Musculoskeletal:      Right lower leg: No edema.      Left lower leg: No edema.   Neurological:      Mental Status: She is alert.         Assessment/Plan   Diagnoses and all orders for this visit:  Encounter for screening mammogram for malignant neoplasm of breast  -     BI mammo bilateral screening tomosynthesis; Future  Health care maintenance-will schedule colonoscopy.  She will schedule mammogram.  Eye and dental appointments are pending.  Pap with GYN.  Patient educated about diet and exercise  -     Uric Acid; Future  -     Comprehensive Metabolic Panel; Future  -     Lipid Panel; Future  Screening for colon cancer  -     Colonoscopy Screening; Average Risk Patient; Future  Class 1 obesity due to excess calories without serious comorbidity with body mass index (BMI) of 30.0 to 30.9 in adult-diet and exercise  Benign essential hypertension-stable on present medications  Renal insufficiency-will recheck creatinine.

## 2025-03-19 LAB
ALBUMIN SERPL-MCNC: 4.8 G/DL (ref 3.6–5.1)
ALP SERPL-CCNC: 95 U/L (ref 37–153)
ALT SERPL-CCNC: 19 U/L (ref 6–29)
ANION GAP SERPL CALCULATED.4IONS-SCNC: 13 MMOL/L (CALC) (ref 7–17)
AST SERPL-CCNC: 20 U/L (ref 10–35)
BILIRUB SERPL-MCNC: 0.6 MG/DL (ref 0.2–1.2)
BUN SERPL-MCNC: 14 MG/DL (ref 7–25)
CALCIUM SERPL-MCNC: 10 MG/DL (ref 8.6–10.4)
CHLORIDE SERPL-SCNC: 100 MMOL/L (ref 98–110)
CHOLEST SERPL-MCNC: 143 MG/DL
CHOLEST/HDLC SERPL: 3 (CALC)
CO2 SERPL-SCNC: 28 MMOL/L (ref 20–32)
CREAT SERPL-MCNC: 0.96 MG/DL (ref 0.5–1.03)
EGFRCR SERPLBLD CKD-EPI 2021: 71 ML/MIN/1.73M2
GLUCOSE SERPL-MCNC: 90 MG/DL (ref 65–99)
HDLC SERPL-MCNC: 47 MG/DL
LDLC SERPL CALC-MCNC: 82 MG/DL (CALC)
NONHDLC SERPL-MCNC: 96 MG/DL (CALC)
POTASSIUM SERPL-SCNC: 3.7 MMOL/L (ref 3.5–5.3)
PROT SERPL-MCNC: 7.2 G/DL (ref 6.1–8.1)
SODIUM SERPL-SCNC: 141 MMOL/L (ref 135–146)
TRIGL SERPL-MCNC: 65 MG/DL
URATE SERPL-MCNC: 5.5 MG/DL (ref 2.5–7)

## 2025-06-20 ENCOUNTER — APPOINTMENT (OUTPATIENT)
Dept: PRIMARY CARE | Facility: CLINIC | Age: 55
End: 2025-06-20
Payer: COMMERCIAL

## 2025-06-23 ENCOUNTER — APPOINTMENT (OUTPATIENT)
Dept: PRIMARY CARE | Facility: CLINIC | Age: 55
End: 2025-06-23
Payer: COMMERCIAL

## 2025-06-23 VITALS
HEART RATE: 72 BPM | BODY MASS INDEX: 30.7 KG/M2 | DIASTOLIC BLOOD PRESSURE: 80 MMHG | SYSTOLIC BLOOD PRESSURE: 118 MMHG | HEIGHT: 66 IN | WEIGHT: 191 LBS

## 2025-06-23 DIAGNOSIS — R06.02 SOB (SHORTNESS OF BREATH): Primary | ICD-10-CM

## 2025-06-23 DIAGNOSIS — Z00.00 HEALTH CARE MAINTENANCE: ICD-10-CM

## 2025-06-23 PROCEDURE — 3079F DIAST BP 80-89 MM HG: CPT | Performed by: INTERNAL MEDICINE

## 2025-06-23 PROCEDURE — 93000 ELECTROCARDIOGRAM COMPLETE: CPT | Performed by: INTERNAL MEDICINE

## 2025-06-23 PROCEDURE — 1036F TOBACCO NON-USER: CPT | Performed by: INTERNAL MEDICINE

## 2025-06-23 PROCEDURE — 99396 PREV VISIT EST AGE 40-64: CPT | Performed by: INTERNAL MEDICINE

## 2025-06-23 PROCEDURE — 3008F BODY MASS INDEX DOCD: CPT | Performed by: INTERNAL MEDICINE

## 2025-06-23 PROCEDURE — 3074F SYST BP LT 130 MM HG: CPT | Performed by: INTERNAL MEDICINE

## 2025-06-23 RX ORDER — TRIAMTERENE AND HYDROCHLOROTHIAZIDE 37.5; 25 MG/1; MG/1
1 CAPSULE ORAL EVERY MORNING
COMMUNITY

## 2025-06-23 ASSESSMENT — PROMIS GLOBAL HEALTH SCALE
EMOTIONAL_PROBLEMS: SOMETIMES
RATE_AVERAGE_PAIN: 1
RATE_GENERAL_HEALTH: GOOD
CARRYOUT_PHYSICAL_ACTIVITIES: COMPLETELY
CARRYOUT_SOCIAL_ACTIVITIES: EXCELLENT
RATE_AVERAGE_FATIGUE: MODERATE
RATE_SOCIAL_SATISFACTION: EXCELLENT
RATE_MENTAL_HEALTH: VERY GOOD
RATE_PHYSICAL_HEALTH: GOOD
RATE_QUALITY_OF_LIFE: VERY GOOD

## 2025-06-23 ASSESSMENT — ENCOUNTER SYMPTOMS
POLYPHAGIA: 0
SINUS PRESSURE: 0
RHINORRHEA: 0
NECK PAIN: 0
VOMITING: 0
MYALGIAS: 0
HEADACHES: 0
DIZZINESS: 0
EYE PAIN: 0
FACIAL SWELLING: 0
SLEEP DISTURBANCE: 0
ANAL BLEEDING: 0
JOINT SWELLING: 0
PHOTOPHOBIA: 0
CHOKING: 0
DIFFICULTY URINATING: 0
BLOOD IN STOOL: 0
ABDOMINAL PAIN: 0
CONSTIPATION: 0
SORE THROAT: 0
RECTAL PAIN: 0
WOUND: 0
TROUBLE SWALLOWING: 0
ACTIVITY CHANGE: 0
ARTHRALGIAS: 0
EYE ITCHING: 0
POLYDIPSIA: 0
DYSURIA: 0
HEMATURIA: 0
EYE DISCHARGE: 0
FACIAL ASYMMETRY: 0
CHEST TIGHTNESS: 0
SHORTNESS OF BREATH: 0
CHILLS: 0
SINUS PAIN: 0
ABDOMINAL DISTENTION: 0
STRIDOR: 0
NAUSEA: 0
WHEEZING: 0
EYE REDNESS: 0
FATIGUE: 0
DIARRHEA: 0
BRUISES/BLEEDS EASILY: 0
WEAKNESS: 0
VOICE CHANGE: 0
FREQUENCY: 0
DIAPHORESIS: 0
APPETITE CHANGE: 0
NUMBNESS: 0
TREMORS: 0
ADENOPATHY: 0
PALPITATIONS: 0
BACK PAIN: 0
NECK STIFFNESS: 0
COLOR CHANGE: 0
FLANK PAIN: 0
COUGH: 0
SPEECH DIFFICULTY: 0
SEIZURES: 0
LIGHT-HEADEDNESS: 0

## 2025-06-23 ASSESSMENT — PATIENT HEALTH QUESTIONNAIRE - PHQ9
1. LITTLE INTEREST OR PLEASURE IN DOING THINGS: NOT AT ALL
2. FEELING DOWN, DEPRESSED OR HOPELESS: NOT AT ALL
SUM OF ALL RESPONSES TO PHQ9 QUESTIONS 1 AND 2: 0

## 2025-06-23 ASSESSMENT — PAIN SCALES - GENERAL: PAINLEVEL_OUTOF10: 0-NO PAIN

## 2025-06-23 NOTE — PROGRESS NOTES
Subjective   Patient ID: Ruby Escoto is a 54 y.o. female who presents for Annual Exam.  History of Present Illness  The patient presents for a routine checkup.    She has not yet undergone her mammogram and is unable to see her gynecologist until July 2025, as per the annual appointment schedule. She has completed her shingles and tetanus vaccines. She resumed her exercise regimen last week. She reports no headaches, dizziness, sinus issues, chest pain, shortness of breath, abdominal pain, nausea, vomiting, diarrhea, or any other discomfort. She attributes her current aches to the aging process and her recent return to physical activity. Her sleep quality is fair. She has consulted a dentist and plans to schedule an eye examination. She is currently grieving the loss of her father but does not believe she is depressed. She has completed the Cologuard test and expresses interest in undergoing a full colonoscopy. She has noticed some weight loss. She has observed swelling in her ankles and feet, which has improved since she started exercising.    She monitors her blood pressure at home, which was recorded as 120/76 during her last check.    She is on a regimen of atorvastatin, amlodipine, and a half dose of triamterene.    FAMILY HISTORY  Her father had congestive heart failure and passed away from an infection that spread. Her middle sister passed away in 2022 from a brain aneurysm.      PMHx, FHx, Social Hx, Surg Hx personally reviewed at this appointment. No pertinent findings and/or changes from prior (if applicable).    ROS: Unless specified above, pt denies wt gain/loss f/c HA LoC CP SOB NVDC. See HPI above, and scanned sheet (if applicable). All other systems are reviewed and are without complaint.   Review of Systems   Constitutional:  Negative for activity change, appetite change, chills, diaphoresis and fatigue.   HENT:  Negative for congestion, dental problem, drooling, ear discharge, ear pain, facial  "swelling, hearing loss, mouth sores, nosebleeds, postnasal drip, rhinorrhea, sinus pressure, sinus pain, sneezing, sore throat, tinnitus, trouble swallowing and voice change.    Eyes:  Negative for photophobia, pain, discharge, redness, itching and visual disturbance.   Respiratory:  Negative for cough, choking, chest tightness, shortness of breath, wheezing and stridor.    Cardiovascular:  Negative for chest pain, palpitations and leg swelling.   Gastrointestinal:  Negative for abdominal distention, abdominal pain, anal bleeding, blood in stool, constipation, diarrhea, nausea, rectal pain and vomiting.   Endocrine: Negative for cold intolerance, heat intolerance, polydipsia, polyphagia and polyuria.   Genitourinary:  Negative for decreased urine volume, difficulty urinating, dysuria, enuresis, flank pain, frequency, genital sores, hematuria and urgency.   Musculoskeletal:  Negative for arthralgias, back pain, gait problem, joint swelling, myalgias, neck pain and neck stiffness.   Skin:  Negative for color change, pallor, rash and wound.   Neurological:  Negative for dizziness, tremors, seizures, syncope, facial asymmetry, speech difficulty, weakness, light-headedness, numbness and headaches.   Hematological:  Negative for adenopathy. Does not bruise/bleed easily.   Psychiatric/Behavioral:  Negative for sleep disturbance.       Objective     /80   Pulse 72   Ht 1.676 m (5' 6\")   Wt 86.6 kg (191 lb)   BMI 30.83 kg/m²      Physical Exam  General Appearance: Normal appearance.  HEENT: Ears: Cerumen present.  Pulmonary: Clear to auscultation, no wheezing, rales or rhonchi.  Cardiovascular: Rate and Rhythm: Normal rate and regular rhythm. Heart sounds: No murmur heard. No gallop.  Abdominal: General: There is no distension. Palpations: There is no mass. Tenderness: There is no abdominal tenderness. There is no guarding.  Musculoskeletal: Right lower leg: No edema. Left lower leg: No edema.  Extremities: No " swelling in the ankles and feet.  Neurological: Alert.    Physical Exam  Constitutional:       General: She is not in acute distress.     Appearance: She is normal weight. She is not ill-appearing, toxic-appearing or diaphoretic.   HENT:      Head: Normocephalic and atraumatic.      Right Ear: Tympanic membrane, ear canal and external ear normal. There is no impacted cerumen.      Left Ear: Tympanic membrane, ear canal and external ear normal. There is no impacted cerumen.      Nose: Nose normal. No congestion or rhinorrhea.      Mouth/Throat:      Mouth: Mucous membranes are moist.      Pharynx: Oropharynx is clear. No oropharyngeal exudate or posterior oropharyngeal erythema.   Eyes:      General: No scleral icterus.        Right eye: No discharge.         Left eye: No discharge.      Extraocular Movements: Extraocular movements intact.      Conjunctiva/sclera: Conjunctivae normal.      Pupils: Pupils are equal, round, and reactive to light.   Neck:      Vascular: No carotid bruit.   Cardiovascular:      Rate and Rhythm: Normal rate and regular rhythm.      Pulses: Normal pulses.      Heart sounds: Normal heart sounds. No murmur heard.     No friction rub. No gallop.   Pulmonary:      Effort: No respiratory distress.      Breath sounds: No stridor. No wheezing, rhonchi or rales.   Chest:      Chest wall: No tenderness.   Abdominal:      General: Abdomen is flat. Bowel sounds are normal. There is no distension.      Palpations: There is no mass.      Tenderness: There is no abdominal tenderness. There is no right CVA tenderness, left CVA tenderness or guarding.      Hernia: No hernia is present.   Musculoskeletal:         General: No swelling, tenderness, deformity or signs of injury.      Cervical back: Normal range of motion and neck supple. No rigidity or tenderness.      Right lower leg: No edema.      Left lower leg: No edema.   Lymphadenopathy:      Cervical: No cervical adenopathy.   Skin:     General: Skin  is warm and dry.      Coloration: Skin is not jaundiced or pale.      Findings: No bruising, erythema, lesion or rash.   Neurological:      General: No focal deficit present.      Mental Status: She is alert and oriented to person, place, and time. Mental status is at baseline.      Cranial Nerves: No cranial nerve deficit.      Sensory: No sensory deficit.      Motor: No weakness.      Coordination: Coordination normal.      Gait: Gait normal.      Deep Tendon Reflexes: Reflexes normal.   Psychiatric:         Mood and Affect: Mood normal.         Behavior: Behavior normal.         Thought Content: Thought content normal.         Judgment: Judgment normal.        Lab Results   Component Value Date    WBC 6.5 06/21/2024    HGB 13.7 06/21/2024    HCT 41.6 06/21/2024     06/21/2024    CHOL 143 03/18/2025    TRIG 65 03/18/2025    HDL 47 (L) 03/18/2025    ALT 19 03/18/2025    AST 20 03/18/2025     03/18/2025    K 3.7 03/18/2025     03/18/2025    CREATININE 0.96 03/18/2025    BUN 14 03/18/2025    CO2 28 03/18/2025    TSH 1.69 06/21/2024     par     Current Outpatient Medications   Medication Instructions    amLODIPine (Norvasc) 10 mg tablet TAKE 1 TABLET BY MOUTH EVERY DAY    atorvastatin (LIPITOR) 10 mg, oral, Daily    triamterene-hydrochlorothiazid (Maxzide) 75-50 mg tablet TAKE 1 TABLET BY MOUTH EVERY DAY *90 DAYS REQUIRED, BUT YOU NEED AN APPT*        US PELVIS TRANSABDOMINAL WITH TRANSVAGINAL  Narrative: Interpreted By:  Alexi White,   STUDY:  US PELVIS TRANSABDOMINAL WITH TRANSVAGINAL  12/21/2023 10:00 am      INDICATION:  52 y/o   F with  Signs/Symptoms:perimenopausal bleeding      COMPARISON:  None.      ACCESSION NUMBER(S):  [id number]      ORDERING CLINICIAN:  ROCAEL STEPHENSON      TECHNIQUE:  Routine ultrasound of the pelvis was performed with duplex Doppler  (color and spectral) evaluation.  Evaluation of the female pelvis was  performed by transabdominal technique .  Static images were  obtained  for remote interpretation.      FINDINGS:  UTERUS:  Size:  6.6 x 4.0 x 4.2 cm  Masses: Myometrium is heterogeneous and contains multiple fibroids,  largest measuring up to 1.9 x 1.7 x 1.5 cm, 1.9 x 1.8 x 1.5 cm, and  1.8 x 1.6 x 1.7 cm. Endometrial thickness: 4 mm.      CERVIX:  Normal.      RIGHT OVARY:  Right ovary size: 2.6 x 1.5 x 1.6 cm      Mildly prominent adnexal vessels are noted. Color/arterial flow is  seen.      LEFT OVARY:  Left ovary not visualized. Mildly prominent adnexal vessels. No  adnexal mass.      FREE FLUID:   Mild free fluid, presumed physiologic.      Impression: Multifibroid uterus.      Mildly prominent bilateral adnexal vessels, nonspecific, however may  be seen with sequela of pelvic congestion syndrome.      MACRO:  None      Signed by: Alexi White 12/22/2023 10:59 PM  Dictation workstation:   [id number]           Assessment & Plan  1. Hypertension.  - Adherence to a low-salt diet and regular exercise is advised.  - Blood pressure readings at home were 120/76; diastolic slightly elevated during the visit.  - Blood pressure will be monitored regularly.  - Follow-up in 6 months.    2. Hyperlipidemia.  -Continue with atorvastatin.  - Last cholesterol check in 03/2025 showed total cholesterol 143, LDL 82, HDL 47.  - Encouraged to increase cardio exercise to raise HDL levels.  - Routine labs to be checked in a couple of months.    3. Health maintenance.  - Colonoscopy and mammogram to be scheduled.  - Pap smear with gynecologist due in 07/2025.  - Immunizations are up to date; hepatitis B to be checked.  - Routine laboratory tests to be performed in a couple of months.  - Dental appointment completed; eye examination to be scheduled.    4. Renal insufficiency.  - Creatinine levels stable at 0.96 in 03/2025.  - BUN and kidney function tests normal.  - Recheck creatinine levels in a few months.  - Routine labs to be checked in a couple of months.    5.  Obesity-  - She is  encouraged to diet and exercise.  Follow-up  The patient will follow up in 6 months.          Marc Bishop MD       This medical note was created with the assistance of artificial intelligence (AI) for documentation purposes. The content has been reviewed and confirmed by the healthcare provider for accuracy and completeness. Patient consented to the use of audio recording and use of AI during their visit.

## 2025-06-23 NOTE — PATIENT INSTRUCTIONS
Please obtain fasting blood work and urine.  Follow-up in 6 months.  Diet and exercise.  Schedule colonoscopy and mammogram.  Schedule an ophthalmology appointment.

## 2025-12-18 ENCOUNTER — APPOINTMENT (OUTPATIENT)
Dept: PRIMARY CARE | Facility: CLINIC | Age: 55
End: 2025-12-18
Payer: COMMERCIAL